# Patient Record
Sex: FEMALE | Race: WHITE | NOT HISPANIC OR LATINO | Employment: OTHER | URBAN - METROPOLITAN AREA
[De-identification: names, ages, dates, MRNs, and addresses within clinical notes are randomized per-mention and may not be internally consistent; named-entity substitution may affect disease eponyms.]

---

## 2018-06-14 ENCOUNTER — HISTORICAL (OUTPATIENT)
Dept: BARIATRICS | Facility: HOSPITAL | Age: 23
End: 2018-06-14

## 2018-07-03 ENCOUNTER — HISTORICAL (OUTPATIENT)
Dept: BARIATRICS | Facility: HOSPITAL | Age: 23
End: 2018-07-03

## 2018-07-13 ENCOUNTER — HISTORICAL (OUTPATIENT)
Dept: SURGERY | Facility: HOSPITAL | Age: 23
End: 2018-07-13

## 2018-07-19 ENCOUNTER — HISTORICAL (OUTPATIENT)
Dept: BARIATRICS | Facility: HOSPITAL | Age: 23
End: 2018-07-19

## 2018-08-02 ENCOUNTER — HISTORICAL (OUTPATIENT)
Dept: BARIATRICS | Facility: HOSPITAL | Age: 23
End: 2018-08-02

## 2018-10-09 ENCOUNTER — HISTORICAL (OUTPATIENT)
Dept: BARIATRICS | Facility: HOSPITAL | Age: 23
End: 2018-10-09

## 2018-12-03 ENCOUNTER — HISTORICAL (OUTPATIENT)
Dept: BARIATRICS | Facility: HOSPITAL | Age: 23
End: 2018-12-03

## 2019-02-19 ENCOUNTER — HISTORICAL (OUTPATIENT)
Dept: BARIATRICS | Facility: HOSPITAL | Age: 24
End: 2019-02-19

## 2020-06-03 DIAGNOSIS — F32.A DEPRESSION, UNSPECIFIED DEPRESSION TYPE: Primary | ICD-10-CM

## 2020-06-03 RX ORDER — SERTRALINE HYDROCHLORIDE 100 MG/1
100 TABLET, FILM COATED ORAL DAILY
Qty: 30 TABLET | Refills: 11 | Status: SHIPPED | OUTPATIENT
Start: 2020-06-03 | End: 2021-06-29

## 2020-06-03 NOTE — TELEPHONE ENCOUNTER
----- Message from Rachel Harsha sent at 6/3/2020 10:33 AM CDT -----  Contact: PT  Type:  Needs Medical Advice    Who Called: Sachi Mota  Symptoms (please be specific): N/A  How long has patient had these symptoms:  N/A  Pharmacy name and phone #:  [facility]  [address]   [phone]  Would the patient rather a call back or a response via [facility]? Call back  Best Call Back Number: [phone] (cell)  Additional Information: PT is requesting for a RX Zoloft (generic Brand). Patient stated that she does not remember the dosage.  Patient also stated that her PCP pass away and was prescribing her with the medication.

## 2020-10-23 ENCOUNTER — TELEPHONE (OUTPATIENT)
Dept: OBSTETRICS AND GYNECOLOGY | Facility: CLINIC | Age: 25
End: 2020-10-23

## 2020-10-23 DIAGNOSIS — Z78.9 ATTEMPTING TO CONCEIVE: Primary | ICD-10-CM

## 2020-10-23 NOTE — TELEPHONE ENCOUNTER
----- Message from Angel Cancino sent at 10/23/2020 10:21 AM CDT -----  Caller is calling in regards to some question and concerns about her getting pregnant // pt states that her periods are not regular // pt just wants someone to call her back to consults // caller can be reached at [phone]

## 2020-10-23 NOTE — TELEPHONE ENCOUNTER
PATIENT CALLED REPORTING SHE HAS BEEN ATTEMPTING PREGNANCY FOR ONE YEAR.  PERIODS HAVE BECOME IRREGULAR THIS SUMMER  SHE IS INTERESTED IN TRYING MEDICATIONS TO HELP HER CONCEIVE  HUSBANDS SEMEN ANALYSIS WAS NORMAL    ALLERGIES: SULFA    MEDS;   ZOLOFT    PELVIC ULTRASOUND ORDERED AND SCHEDULED

## 2020-10-27 ENCOUNTER — PROCEDURE VISIT (OUTPATIENT)
Dept: OBSTETRICS AND GYNECOLOGY | Facility: CLINIC | Age: 25
End: 2020-10-27
Payer: COMMERCIAL

## 2020-10-27 DIAGNOSIS — Z78.9 ATTEMPTING TO CONCEIVE: ICD-10-CM

## 2020-10-27 PROCEDURE — 76830 PR  ECHOGRAPHY,TRANSVAGINAL: ICD-10-PCS | Mod: S$GLB,,, | Performed by: OBSTETRICS & GYNECOLOGY

## 2020-10-27 PROCEDURE — 76830 TRANSVAGINAL US NON-OB: CPT | Mod: S$GLB,,, | Performed by: OBSTETRICS & GYNECOLOGY

## 2020-10-27 NOTE — Clinical Note
Please call  she likely needs an apt to discuss as her endometrial strip is thickened   also needs U/s right after her cycle to re assess the endometrial stripe

## 2020-11-13 ENCOUNTER — TELEPHONE (OUTPATIENT)
Dept: OBSTETRICS AND GYNECOLOGY | Facility: CLINIC | Age: 25
End: 2020-11-13

## 2020-11-13 NOTE — TELEPHONE ENCOUNTER
----- Message from Arlene Bacon sent at 11/13/2020  9:31 AM CST -----  states that she started cycle, when does she need to do ultrasound..[phone]

## 2020-11-17 ENCOUNTER — TELEPHONE (OUTPATIENT)
Dept: OBSTETRICS AND GYNECOLOGY | Facility: CLINIC | Age: 25
End: 2020-11-17

## 2020-11-17 DIAGNOSIS — R93.89 INCREASED ENDOMETRIAL STRIPE THICKNESS: Primary | ICD-10-CM

## 2020-11-17 NOTE — TELEPHONE ENCOUNTER
----- Message from Magdy Wagner III, MD sent at 11/7/2020  4:29 PM CST -----  Please call  she likely needs an apt to discuss as her endometrial strip is thickened   also needs U/s right after her cycle to re assess the endometrial stripe

## 2020-11-17 NOTE — TELEPHONE ENCOUNTER
Spoke with patient about u/s results. She states that she completed a cycle yesterday. Appointment and u/s scheduled for tomorrow.

## 2020-11-18 ENCOUNTER — PROCEDURE VISIT (OUTPATIENT)
Dept: OBSTETRICS AND GYNECOLOGY | Facility: CLINIC | Age: 25
End: 2020-11-18
Payer: COMMERCIAL

## 2020-11-18 ENCOUNTER — OFFICE VISIT (OUTPATIENT)
Dept: OBSTETRICS AND GYNECOLOGY | Facility: CLINIC | Age: 25
End: 2020-11-18
Payer: COMMERCIAL

## 2020-11-18 VITALS
WEIGHT: 235 LBS | HEART RATE: 64 BPM | SYSTOLIC BLOOD PRESSURE: 123 MMHG | DIASTOLIC BLOOD PRESSURE: 86 MMHG | BODY MASS INDEX: 41.64 KG/M2 | HEIGHT: 63 IN

## 2020-11-18 DIAGNOSIS — R93.89 INCREASED ENDOMETRIAL STRIPE THICKNESS: ICD-10-CM

## 2020-11-18 DIAGNOSIS — N91.1 AMENORRHEA, SECONDARY: Primary | ICD-10-CM

## 2020-11-18 PROCEDURE — 99214 OFFICE O/P EST MOD 30 MIN: CPT | Mod: 25,S$GLB,, | Performed by: OBSTETRICS & GYNECOLOGY

## 2020-11-18 PROCEDURE — 99214 PR OFFICE/OUTPT VISIT, EST, LEVL IV, 30-39 MIN: ICD-10-PCS | Mod: 25,S$GLB,, | Performed by: OBSTETRICS & GYNECOLOGY

## 2020-11-18 PROCEDURE — 76830 PR  ECHOGRAPHY,TRANSVAGINAL: ICD-10-PCS | Mod: S$GLB,,, | Performed by: OBSTETRICS & GYNECOLOGY

## 2020-11-18 PROCEDURE — 76830 TRANSVAGINAL US NON-OB: CPT | Mod: S$GLB,,, | Performed by: OBSTETRICS & GYNECOLOGY

## 2020-11-18 RX ORDER — LETROZOLE 2.5 MG/1
5 TABLET, FILM COATED ORAL DAILY
Qty: 10 TABLET | Refills: 3 | Status: SHIPPED | OUTPATIENT
Start: 2020-11-18 | End: 2021-08-18

## 2020-11-18 RX ORDER — MEDROXYPROGESTERONE ACETATE 10 MG/1
10 TABLET ORAL DAILY
Qty: 10 TABLET | Refills: 3 | Status: SHIPPED | OUTPATIENT
Start: 2020-11-18 | End: 2021-08-18

## 2020-11-18 NOTE — PROGRESS NOTES
Subjective:       Patient ID: Sachi Loaiza is a 25 y.o. female.    Chief Complaint:  Follow-up (Had u/s. Thickened endometrial stripe. Cycle ended yesterday. Desires pregnancy. Previous cycle was in June. Cycles are very irregular. )      History of Present Illness  She is doing well.  No new health issues,  No abnormal bleeding, No GI or Gu concerns,  No dyspareunia  . Her cycles were regular   Last visit   In may her cycles started to become irregular  Now she has had some that are very spaced out.  She has ovulated twice based on the ovulation strip.   Her  did a semen analysis and was fine.     HPI      GYN & OB History  Patient's last menstrual period was 11/11/2020.     Date of Last Pap: No result found    OB History   No obstetric history on file.       Review of Systems  Review of Systems   Genitourinary:        Oligo             Objective:    Physical Exam       Assessment:        1. Amenorrhea, secondary                 Plan:           She elected to try meds without doing an HSG   She is aware that her tubes could be blocked but not likely based on her hx.     Offered to see infertility  She will try 3-4 rounds of meds   Pap discussed will return for her annual     Pt is aware we call all results. If she does not hear from our office regarding her result within a week of having a study or procedure performed she is to call the office so that we can research the result for her.  Birth control discussed  Chaperone was present

## 2020-12-04 ENCOUNTER — TELEPHONE (OUTPATIENT)
Dept: OBSTETRICS AND GYNECOLOGY | Facility: CLINIC | Age: 25
End: 2020-12-04

## 2020-12-04 NOTE — TELEPHONE ENCOUNTER
----- Message from Jada Donovan sent at 12/4/2020  8:15 AM CST -----  Patient need to speak to nurse regarding her prescription needing to be signed. Call back number  [phone]. Luh

## 2020-12-04 NOTE — TELEPHONE ENCOUNTER
Spoke to patient. She states that she was unable to  some of her prescriptions at the pharmacy.     [facility] called [facility] and they said that 2 of her prescriptions need a pa and they will fax the request.

## 2020-12-11 ENCOUNTER — TELEPHONE (OUTPATIENT)
Dept: OBSTETRICS AND GYNECOLOGY | Facility: CLINIC | Age: 25
End: 2020-12-11

## 2020-12-11 NOTE — TELEPHONE ENCOUNTER
LVM informing pt per Dr Pollock sometimes it takes until day 10 and the Provera is completed before the period starts. Instructed pt to call back on Monday if any questions.

## 2020-12-11 NOTE — TELEPHONE ENCOUNTER
----- Message from Becca Nazario sent at 12/11/2020 11:01 AM CST -----  Regarding: Medication question  Please call Sachi to discuss a question she has about medroxyPROGESTERone (PROVERA) 10 MG tablet, Pt stated that she is on this medication to start her period but she has not started it yet and she's on day 9.  Contact number: [phone].

## 2020-12-15 ENCOUNTER — TELEPHONE (OUTPATIENT)
Dept: OBSTETRICS AND GYNECOLOGY | Facility: CLINIC | Age: 25
End: 2020-12-15

## 2020-12-15 NOTE — TELEPHONE ENCOUNTER
Patient completed a round of provera and still has not started her cycle several days later.     Advised patient I will check with Dr. Wagner and call her back.

## 2020-12-15 NOTE — TELEPHONE ENCOUNTER
----- Message from Becca Nazario sent at 12/14/2020 11:43 AM CST -----  Regarding: Medication Question  Caller has a question regarding the medication she's taking to start her cycle. Patient stated that she has finished the medication but she have not started her cycle. Patient would like to know if she needs to be put on a different medication or if she needs a refill [phone].

## 2020-12-16 NOTE — TELEPHONE ENCOUNTER
Spoke to Dr. Wagner. He advised that patient take another pregnancy test. If negative start femara and if positive start progesterone.     Spoke to patient and advised of Dr. Wagner's recommendation. She states that she will get a test and call tomorrow with result.

## 2020-12-18 ENCOUNTER — TELEPHONE (OUTPATIENT)
Dept: OBSTETRICS AND GYNECOLOGY | Facility: CLINIC | Age: 25
End: 2020-12-18

## 2020-12-18 NOTE — TELEPHONE ENCOUNTER
----- Message from Kat Hoang sent at 12/18/2020 10:17 AM CST -----  Contact: self  Requesting call back regarding speaking with nurse about pt medication. States she started her cycle today and wants to know if insurance has been contacted. Please call back at [phone].

## 2020-12-18 NOTE — TELEPHONE ENCOUNTER
"Pt. Checking on status of PA of fertility meds.  [facility] is suppose to fax over the PA form to "hailey" on meds. Today.    "

## 2020-12-18 NOTE — TELEPHONE ENCOUNTER
----- Message from Kat Hoang sent at 12/18/2020 10:17 AM CST -----  Contact: self  Requesting call back regarding speaking with nurse about pt medication. States she started her cycle today and wants to know if insurance has been contacted. Please call back at [phone].

## 2020-12-18 NOTE — TELEPHONE ENCOUNTER
----- Message from Nilsa Wills sent at 12/18/2020 10:58 AM CST -----  Regarding: Returning call  Contact: [phone]  Would like to consult with the nurse, patient is returning the nurse call, please call back thanks sj   .Type:  Patient Returning Call    Who Called Ms Loaiza  Who Left Message for Patient:  Does the patient know what this is regarding?:  Would the patient rather a call back or a response via [facility]? callback  Best Call Back Number:[phone]  Additional Information:

## 2020-12-21 NOTE — TELEPHONE ENCOUNTER
PA is in process. Will check on it again this afternoon. Patient states that today is day 3 of her cycle.

## 2020-12-28 ENCOUNTER — TELEPHONE (OUTPATIENT)
Dept: OBSTETRICS AND GYNECOLOGY | Facility: CLINIC | Age: 25
End: 2020-12-28

## 2021-01-06 ENCOUNTER — PROCEDURE VISIT (OUTPATIENT)
Dept: OBSTETRICS AND GYNECOLOGY | Facility: CLINIC | Age: 26
End: 2021-01-06
Payer: COMMERCIAL

## 2021-01-06 DIAGNOSIS — N91.1 AMENORRHEA, SECONDARY: ICD-10-CM

## 2021-01-06 PROCEDURE — 76830 PR  ECHOGRAPHY,TRANSVAGINAL: ICD-10-PCS | Mod: S$GLB,,, | Performed by: OBSTETRICS & GYNECOLOGY

## 2021-01-06 PROCEDURE — 76830 TRANSVAGINAL US NON-OB: CPT | Mod: S$GLB,,, | Performed by: OBSTETRICS & GYNECOLOGY

## 2021-01-12 ENCOUNTER — TELEPHONE (OUTPATIENT)
Dept: OBSTETRICS AND GYNECOLOGY | Facility: CLINIC | Age: 26
End: 2021-01-12

## 2021-01-19 ENCOUNTER — TELEPHONE (OUTPATIENT)
Dept: OBSTETRICS AND GYNECOLOGY | Facility: CLINIC | Age: 26
End: 2021-01-19

## 2021-01-19 DIAGNOSIS — Z32.00 POSSIBLE PREGNANCY: Primary | ICD-10-CM

## 2021-01-20 LAB
B-HCG SERPL-ACNC: 6.36 MIU/ML
PROGEST SERPL-MCNC: 2.01 NG/ML

## 2021-01-21 ENCOUNTER — TELEPHONE (OUTPATIENT)
Dept: OBSTETRICS AND GYNECOLOGY | Facility: CLINIC | Age: 26
End: 2021-01-21

## 2021-01-22 ENCOUNTER — TELEPHONE (OUTPATIENT)
Dept: OBSTETRICS AND GYNECOLOGY | Facility: CLINIC | Age: 26
End: 2021-01-22

## 2021-01-22 LAB
B-HCG SERPL-ACNC: <1.59 MIU/ML
PROGEST SERPL-MCNC: 0.34 NG/ML

## 2021-02-02 ENCOUNTER — TELEPHONE (OUTPATIENT)
Dept: OBSTETRICS AND GYNECOLOGY | Facility: CLINIC | Age: 26
End: 2021-02-02

## 2021-02-02 ENCOUNTER — PROCEDURE VISIT (OUTPATIENT)
Dept: OBSTETRICS AND GYNECOLOGY | Facility: CLINIC | Age: 26
End: 2021-02-02
Payer: MEDICAID

## 2021-02-02 DIAGNOSIS — Z34.90 PREGNANCY, UNSPECIFIED GESTATIONAL AGE: ICD-10-CM

## 2021-02-02 DIAGNOSIS — Z34.90 PREGNANCY, UNSPECIFIED GESTATIONAL AGE: Primary | ICD-10-CM

## 2021-02-02 PROCEDURE — 76830 TRANSVAGINAL US NON-OB: CPT | Mod: S$GLB,,, | Performed by: OBSTETRICS & GYNECOLOGY

## 2021-02-02 PROCEDURE — 76830 PR  ECHOGRAPHY,TRANSVAGINAL: ICD-10-PCS | Mod: S$GLB,,, | Performed by: OBSTETRICS & GYNECOLOGY

## 2021-02-25 ENCOUNTER — TELEPHONE (OUTPATIENT)
Dept: OBSTETRICS AND GYNECOLOGY | Facility: CLINIC | Age: 26
End: 2021-02-25

## 2021-02-25 DIAGNOSIS — Z32.01 POSITIVE PREGNANCY TEST: Primary | ICD-10-CM

## 2021-02-26 LAB
B-HCG SERPL-ACNC: 6028 MIU/ML
PROGEST SERPL-MCNC: 15.9 NG/ML

## 2021-03-01 ENCOUNTER — PATIENT MESSAGE (OUTPATIENT)
Dept: OBSTETRICS AND GYNECOLOGY | Facility: CLINIC | Age: 26
End: 2021-03-01

## 2021-03-01 DIAGNOSIS — R79.89 LOW SERUM PROGESTERONE: Primary | ICD-10-CM

## 2021-03-01 LAB — B-HCG SERPL-ACNC: NORMAL MIU/ML

## 2021-03-01 RX ORDER — PROGESTERONE 200 MG/1
200 CAPSULE ORAL NIGHTLY
Qty: 30 CAPSULE | Refills: 2 | Status: SHIPPED | OUTPATIENT
Start: 2021-03-01 | End: 2021-03-31

## 2021-03-08 ENCOUNTER — CLINICAL SUPPORT (OUTPATIENT)
Dept: OBSTETRICS AND GYNECOLOGY | Facility: CLINIC | Age: 26
End: 2021-03-08
Payer: MEDICAID

## 2021-03-08 ENCOUNTER — OFFICE VISIT (OUTPATIENT)
Dept: OBSTETRICS AND GYNECOLOGY | Facility: CLINIC | Age: 26
End: 2021-03-08
Payer: MEDICAID

## 2021-03-08 VITALS
DIASTOLIC BLOOD PRESSURE: 87 MMHG | SYSTOLIC BLOOD PRESSURE: 128 MMHG | HEART RATE: 97 BPM | BODY MASS INDEX: 40.03 KG/M2 | WEIGHT: 226 LBS

## 2021-03-08 VITALS
SYSTOLIC BLOOD PRESSURE: 128 MMHG | BODY MASS INDEX: 40.03 KG/M2 | WEIGHT: 226 LBS | DIASTOLIC BLOOD PRESSURE: 87 MMHG | HEART RATE: 97 BPM

## 2021-03-08 DIAGNOSIS — Z3A.01 LESS THAN 8 WEEKS GESTATION OF PREGNANCY: Primary | ICD-10-CM

## 2021-03-08 DIAGNOSIS — Z01.419 WELL WOMAN EXAM WITH ROUTINE GYNECOLOGICAL EXAM: Primary | ICD-10-CM

## 2021-03-08 DIAGNOSIS — O21.9 NAUSEA AND VOMITING IN PREGNANCY: Primary | ICD-10-CM

## 2021-03-08 PROCEDURE — 99395 PREV VISIT EST AGE 18-39: CPT | Mod: S$GLB,,, | Performed by: OBSTETRICS & GYNECOLOGY

## 2021-03-08 PROCEDURE — 99395 PR PREVENTIVE VISIT,EST,18-39: ICD-10-PCS | Mod: S$GLB,,, | Performed by: OBSTETRICS & GYNECOLOGY

## 2021-03-08 RX ORDER — DOXYLAMINE SUCCINATE AND PYRIDOXINE HYDROCHLORIDE, DELAYED RELEASE TABLETS 10 MG/10 MG 10; 10 MG/1; MG/1
TABLET, DELAYED RELEASE ORAL
Qty: 60 TABLET | Refills: 1 | Status: SHIPPED | OUTPATIENT
Start: 2021-03-08 | End: 2021-04-09

## 2021-03-09 LAB
CHLAMYDIA: NEGATIVE
GONORRHEA: NEGATIVE
SOURCE: NORMAL

## 2021-03-10 DIAGNOSIS — B37.9 YEAST INFECTION: ICD-10-CM

## 2021-03-10 DIAGNOSIS — O23.41 URINARY TRACT INFECTION IN MOTHER DURING FIRST TRIMESTER OF PREGNANCY: Primary | ICD-10-CM

## 2021-03-10 DIAGNOSIS — O99.011 ANEMIA AFFECTING PREGNANCY IN FIRST TRIMESTER: ICD-10-CM

## 2021-03-10 LAB
ABS NRBC COUNT: 0 X 10 3/UL (ref 0–0.01)
ABSOLUTE BASOPHIL: 0.05 X 10 3/UL (ref 0–0.22)
ABSOLUTE EOSINOPHIL: 0.2 X 10 3/UL (ref 0.04–0.54)
ABSOLUTE IMMATURE GRAN: 0.04 X 10 3/UL (ref 0–0.04)
ABSOLUTE LYMPHOCYTE: 2.73 X 10 3/UL (ref 0.86–4.75)
ABSOLUTE MONOCYTE: 0.7 X 10 3/UL (ref 0.22–1.08)
ALBUMIN SERPL-MCNC: 4.2 G/DL (ref 3.5–5.2)
ALBUMIN/GLOB SERPL ELPH: 1.4 {RATIO} (ref 1–2.7)
ALP ISOS SERPL LEV INH-CCNC: 89 U/L (ref 35–105)
ALT (SGPT): 30 U/L (ref 0–33)
AMORPH URATE CRY URNS QL MICRO: NEGATIVE
ANION GAP SERPL CALC-SCNC: 9 MMOL/L (ref 8–17)
ANTIBODY SCREEN: NEGATIVE
AST SERPL-CCNC: 18 U/L (ref 0–32)
B-HCG SERPL-ACNC: NORMAL MIU/ML
B12: 457 PG/ML (ref 232–1245)
BACTERIA #/AREA URNS HPF: ABNORMAL /[HPF]
BASOPHILS NFR BLD: 0.4 % (ref 0.2–1.2)
BILIRUB UR QL STRIP: NEGATIVE
BILIRUBIN, TOTAL: 0.28 MG/DL (ref 0–1.2)
BLOOD GROUPING: NORMAL
BLOOD TYPE (D): POSITIVE
BUN/CREAT SERPL: 13.7 (ref 6–20)
CALCIUM SERPL-MCNC: 9 MG/DL (ref 8.6–10.2)
CARBON DIOXIDE, CO2: 26 MMOL/L (ref 22–29)
CHLORIDE: 104 MMOL/L (ref 98–107)
CLARITY UR: ABNORMAL
COLOR UR: YELLOW
CREAT SERPL-MCNC: 0.51 MG/DL (ref 0.5–0.9)
EOSINOPHIL NFR BLD: 1.8 % (ref 0.7–7)
EPITHELIAL CELLS: ABNORMAL
FOLATE SERPL-MCNC: 10.3 NG/ML (ref 5–27.2)
GFR ESTIMATION: 146.93
GLOBULIN: 3 G/DL (ref 1.5–4.5)
GLUCOSE (UA): NEGATIVE MG/DL
GLUCOSE: 86 MG/DL (ref 74–106)
HBV SURFACE AG SERPL QL IA: NONREACTIVE
HCT VFR BLD AUTO: 36.7 % (ref 37–47)
HCV IGG SERPL QL IA: NONREACTIVE
HGB BLD-MCNC: 10.9 G/DL (ref 12–16)
HIV 1+2 AB+HIV1 P24 AG SERPL QL IA: NONREACTIVE
HSV1 IGG SER-ACNC: NONREACTIVE
HSV2 IGG SER-ACNC: NONREACTIVE
IMMATURE GRANULOCYTES: 0.4 % (ref 0–0.5)
KETONES UR QL STRIP: 15 MG/DL
LEUKOCYTE ESTERASE UR QL STRIP: ABNORMAL
LYMPHOCYTES NFR BLD: 24.2 % (ref 19.3–53.1)
MCH RBC QN AUTO: 22.9 PG (ref 27–32)
MCHC RBC AUTO-ENTMCNC: 29.7 G/DL (ref 32–36)
MCV RBC AUTO: 77.1 FL (ref 82–100)
MONOCYTES NFR BLD: 6.2 % (ref 4.7–12.5)
MUCOUS THREADS URNS QL MICRO: ABNORMAL
NEUTROPHILS # BLD AUTO: 7.55 X 10 3/UL (ref 2.15–7.56)
NEUTROPHILS NFR BLD: 67 %
NITRITE UR QL STRIP: NEGATIVE
NUCLEATED RED BLOOD CELLS: 0 /100 WBC (ref 0–0.2)
OCCULT BLOOD: ABNORMAL
PH, URINE: 6 (ref 5–7.5)
PLATELET # BLD AUTO: 456 X 10 3/UL (ref 135–400)
POTASSIUM: 4.4 MMOL/L (ref 3.5–5.1)
PROT SNV-MCNC: 7.2 G/DL (ref 6.4–8.3)
PROT UR QL STRIP: 15 MG/DL
RBC # BLD AUTO: 4.76 X 10 6/UL (ref 4.2–5.4)
RBC/HPF: NEGATIVE
RDW-SD: 49.4 FL (ref 37–54)
RUBELLA IGG SCREEN: NORMAL
SODIUM: 139 MMOL/L (ref 136–145)
SP GR UR STRIP: 1.02 (ref 1–1.03)
SYPHILIS TREPONEMAL ANTIBODY: NONREACTIVE
T4, FREE: 1.23 NG/DL (ref 0.93–1.7)
TSH SERPL DL<=0.005 MIU/L-ACNC: 1.8 UIU/ML (ref 0.27–4.2)
UREA NITROGEN (BUN): 7 MG/DL (ref 6–20)
URINE CULTURE, ROUTINE: NORMAL
UROBILINOGEN, URINE: NORMAL E.U./DL (ref 0–1)
WBC # BLD: 11.27 X 10 3/UL (ref 4.3–10.8)
WBC/HPF: ABNORMAL
YEAST URNS QL MICRO: ABNORMAL

## 2021-03-10 RX ORDER — TERCONAZOLE 4 MG/G
1 CREAM VAGINAL NIGHTLY
Qty: 1 TUBE | Refills: 1 | Status: SHIPPED | OUTPATIENT
Start: 2021-03-10 | End: 2021-08-18

## 2021-03-10 RX ORDER — CEPHALEXIN 250 MG/1
250 CAPSULE ORAL 3 TIMES DAILY
Qty: 21 CAPSULE | Refills: 0 | Status: SHIPPED | OUTPATIENT
Start: 2021-03-10 | End: 2021-03-17

## 2021-03-11 ENCOUNTER — PROCEDURE VISIT (OUTPATIENT)
Dept: OBSTETRICS AND GYNECOLOGY | Facility: CLINIC | Age: 26
End: 2021-03-11
Payer: MEDICAID

## 2021-03-11 DIAGNOSIS — Z3A.01 LESS THAN 8 WEEKS GESTATION OF PREGNANCY: ICD-10-CM

## 2021-03-11 PROCEDURE — 76801 PR US, OB <14WKS, TRANSABD, SINGLE GESTATION: ICD-10-PCS | Mod: S$GLB,,, | Performed by: OBSTETRICS & GYNECOLOGY

## 2021-03-11 PROCEDURE — 76801 OB US < 14 WKS SINGLE FETUS: CPT | Mod: S$GLB,,, | Performed by: OBSTETRICS & GYNECOLOGY

## 2021-04-05 ENCOUNTER — PROCEDURE VISIT (OUTPATIENT)
Dept: OBSTETRICS AND GYNECOLOGY | Facility: CLINIC | Age: 26
End: 2021-04-05

## 2021-04-05 ENCOUNTER — ROUTINE PRENATAL (OUTPATIENT)
Dept: OBSTETRICS AND GYNECOLOGY | Facility: CLINIC | Age: 26
End: 2021-04-05
Payer: MEDICAID

## 2021-04-05 VITALS
WEIGHT: 224 LBS | BODY MASS INDEX: 39.68 KG/M2 | HEART RATE: 81 BPM | DIASTOLIC BLOOD PRESSURE: 81 MMHG | SYSTOLIC BLOOD PRESSURE: 123 MMHG

## 2021-04-05 DIAGNOSIS — Z34.90 PREGNANCY, UNSPECIFIED GESTATIONAL AGE: Primary | ICD-10-CM

## 2021-04-05 DIAGNOSIS — O30.001 TWIN GESTATION IN FIRST TRIMESTER, UNSPECIFIED MULTIPLE GESTATION TYPE: Primary | ICD-10-CM

## 2021-04-05 PROCEDURE — 0502F PR SUBSEQUENT PRENATAL CARE: ICD-10-PCS | Mod: S$GLB,,, | Performed by: OBSTETRICS & GYNECOLOGY

## 2021-04-05 PROCEDURE — 0502F SUBSEQUENT PRENATAL CARE: CPT | Mod: S$GLB,,, | Performed by: OBSTETRICS & GYNECOLOGY

## 2021-04-05 PROCEDURE — 76801 PR US, OB <14WKS, TRANSABD, SINGLE GESTATION: ICD-10-PCS | Mod: S$GLB,,, | Performed by: OBSTETRICS & GYNECOLOGY

## 2021-04-05 PROCEDURE — 76802 PR US, OB <14WKS, TRANSABD, EA ADDL GESTATION: ICD-10-PCS | Mod: S$GLB,,, | Performed by: OBSTETRICS & GYNECOLOGY

## 2021-04-05 PROCEDURE — 76801 OB US < 14 WKS SINGLE FETUS: CPT | Mod: S$GLB,,, | Performed by: OBSTETRICS & GYNECOLOGY

## 2021-04-05 PROCEDURE — 76802 OB US < 14 WKS ADDL FETUS: CPT | Mod: S$GLB,,, | Performed by: OBSTETRICS & GYNECOLOGY

## 2021-04-05 RX ORDER — PROMETHAZINE HYDROCHLORIDE 12.5 MG/1
12.5 TABLET ORAL 4 TIMES DAILY
Qty: 18 TABLET | Refills: 3 | Status: SHIPPED | OUTPATIENT
Start: 2021-04-05 | End: 2021-08-18

## 2021-04-19 ENCOUNTER — TELEPHONE (OUTPATIENT)
Dept: OBSTETRICS AND GYNECOLOGY | Facility: CLINIC | Age: 26
End: 2021-04-19

## 2021-04-19 ENCOUNTER — PATIENT MESSAGE (OUTPATIENT)
Dept: OBSTETRICS AND GYNECOLOGY | Facility: CLINIC | Age: 26
End: 2021-04-19

## 2021-05-04 ENCOUNTER — ROUTINE PRENATAL (OUTPATIENT)
Dept: OBSTETRICS AND GYNECOLOGY | Facility: CLINIC | Age: 26
End: 2021-05-04
Payer: MEDICAID

## 2021-05-04 ENCOUNTER — TELEPHONE (OUTPATIENT)
Dept: OBSTETRICS AND GYNECOLOGY | Facility: CLINIC | Age: 26
End: 2021-05-04

## 2021-05-04 ENCOUNTER — PROCEDURE VISIT (OUTPATIENT)
Dept: OBSTETRICS AND GYNECOLOGY | Facility: CLINIC | Age: 26
End: 2021-05-04
Payer: MEDICAID

## 2021-05-04 VITALS
WEIGHT: 222 LBS | BODY MASS INDEX: 39.33 KG/M2 | HEART RATE: 80 BPM | DIASTOLIC BLOOD PRESSURE: 76 MMHG | SYSTOLIC BLOOD PRESSURE: 120 MMHG

## 2021-05-04 DIAGNOSIS — F32.A ANXIETY AND DEPRESSION: Primary | ICD-10-CM

## 2021-05-04 DIAGNOSIS — O30.002 TWIN GESTATION IN SECOND TRIMESTER, UNSPECIFIED MULTIPLE GESTATION TYPE: ICD-10-CM

## 2021-05-04 DIAGNOSIS — Z34.90 PREGNANCY, UNSPECIFIED GESTATIONAL AGE: ICD-10-CM

## 2021-05-04 DIAGNOSIS — O30.002 TWIN GESTATION IN SECOND TRIMESTER, UNSPECIFIED MULTIPLE GESTATION TYPE: Primary | ICD-10-CM

## 2021-05-04 DIAGNOSIS — F41.9 ANXIETY AND DEPRESSION: Primary | ICD-10-CM

## 2021-05-04 PROCEDURE — 76816 PR  US,PREGNANT UTERUS,F/U,TRANSABD APP: ICD-10-PCS | Mod: 22,S$GLB,, | Performed by: OBSTETRICS & GYNECOLOGY

## 2021-05-04 PROCEDURE — 99213 PR OFFICE/OUTPT VISIT, EST, LEVL III, 20-29 MIN: ICD-10-PCS | Mod: TH,S$GLB,, | Performed by: OBSTETRICS & GYNECOLOGY

## 2021-05-04 PROCEDURE — 76816 OB US FOLLOW-UP PER FETUS: CPT | Mod: 22,S$GLB,, | Performed by: OBSTETRICS & GYNECOLOGY

## 2021-05-04 PROCEDURE — 99213 OFFICE O/P EST LOW 20 MIN: CPT | Mod: TH,S$GLB,, | Performed by: OBSTETRICS & GYNECOLOGY

## 2021-05-04 RX ORDER — ESCITALOPRAM OXALATE 10 MG/1
TABLET ORAL
Qty: 90 TABLET | Refills: 3 | Status: SHIPPED | OUTPATIENT
Start: 2021-05-04 | End: 2021-08-18

## 2021-05-04 RX ORDER — ONDANSETRON HYDROCHLORIDE 8 MG/1
8 TABLET, FILM COATED ORAL 2 TIMES DAILY
Qty: 18 TABLET | Refills: 1 | Status: SHIPPED | OUTPATIENT
Start: 2021-05-04 | End: 2021-08-18

## 2021-05-11 ENCOUNTER — TELEPHONE (OUTPATIENT)
Dept: OBSTETRICS AND GYNECOLOGY | Facility: CLINIC | Age: 26
End: 2021-05-11

## 2021-05-21 ENCOUNTER — PATIENT MESSAGE (OUTPATIENT)
Dept: OBSTETRICS AND GYNECOLOGY | Facility: CLINIC | Age: 26
End: 2021-05-21

## 2021-05-24 ENCOUNTER — TELEPHONE (OUTPATIENT)
Dept: OBSTETRICS AND GYNECOLOGY | Facility: CLINIC | Age: 26
End: 2021-05-24

## 2021-05-24 DIAGNOSIS — O26.859 SPOTTING IN PREGNANCY: Primary | ICD-10-CM

## 2021-05-25 ENCOUNTER — PROCEDURE VISIT (OUTPATIENT)
Dept: OBSTETRICS AND GYNECOLOGY | Facility: CLINIC | Age: 26
End: 2021-05-25
Payer: MEDICAID

## 2021-05-25 DIAGNOSIS — O26.859 SPOTTING IN PREGNANCY: ICD-10-CM

## 2021-05-25 PROCEDURE — 76815 PR  US,PREGNANT UTERUS,LIMITED, 1/> FETUSES: ICD-10-PCS | Mod: S$GLB,,, | Performed by: OBSTETRICS & GYNECOLOGY

## 2021-05-25 PROCEDURE — 76815 OB US LIMITED FETUS(S): CPT | Mod: S$GLB,,, | Performed by: OBSTETRICS & GYNECOLOGY

## 2021-06-01 ENCOUNTER — ROUTINE PRENATAL (OUTPATIENT)
Dept: OBSTETRICS AND GYNECOLOGY | Facility: CLINIC | Age: 26
End: 2021-06-01
Payer: MEDICAID

## 2021-06-01 VITALS
HEART RATE: 78 BPM | DIASTOLIC BLOOD PRESSURE: 78 MMHG | SYSTOLIC BLOOD PRESSURE: 121 MMHG | BODY MASS INDEX: 40.03 KG/M2 | WEIGHT: 226 LBS

## 2021-06-01 DIAGNOSIS — F41.9 ANXIETY AND DEPRESSION: ICD-10-CM

## 2021-06-01 DIAGNOSIS — F32.A ANXIETY AND DEPRESSION: ICD-10-CM

## 2021-06-01 DIAGNOSIS — Z34.90 PREGNANCY, UNSPECIFIED GESTATIONAL AGE: Primary | ICD-10-CM

## 2021-06-01 PROCEDURE — 99213 PR OFFICE/OUTPT VISIT, EST, LEVL III, 20-29 MIN: ICD-10-PCS | Mod: TH,S$GLB,, | Performed by: OBSTETRICS & GYNECOLOGY

## 2021-06-01 PROCEDURE — 99213 OFFICE O/P EST LOW 20 MIN: CPT | Mod: TH,S$GLB,, | Performed by: OBSTETRICS & GYNECOLOGY

## 2021-06-01 RX ORDER — BUSPIRONE HYDROCHLORIDE 5 MG/1
TABLET ORAL
COMMUNITY
Start: 2021-05-16 | End: 2021-08-18

## 2021-06-09 ENCOUNTER — PATIENT MESSAGE (OUTPATIENT)
Dept: OBSTETRICS AND GYNECOLOGY | Facility: CLINIC | Age: 26
End: 2021-06-09

## 2021-06-10 ENCOUNTER — TELEPHONE (OUTPATIENT)
Dept: OBSTETRICS AND GYNECOLOGY | Facility: CLINIC | Age: 26
End: 2021-06-10

## 2021-06-15 ENCOUNTER — PROCEDURE VISIT (OUTPATIENT)
Dept: OBSTETRICS AND GYNECOLOGY | Facility: CLINIC | Age: 26
End: 2021-06-15
Payer: MEDICAID

## 2021-06-15 DIAGNOSIS — O30.002 TWIN GESTATION IN SECOND TRIMESTER, UNSPECIFIED MULTIPLE GESTATION TYPE: ICD-10-CM

## 2021-06-15 PROCEDURE — 76805 OB US >/= 14 WKS SNGL FETUS: CPT | Mod: S$GLB,,, | Performed by: OBSTETRICS & GYNECOLOGY

## 2021-06-15 PROCEDURE — 76805 PR US, OB 14+WKS, TRANSABD, SINGLE GESTATION: ICD-10-PCS | Mod: S$GLB,,, | Performed by: OBSTETRICS & GYNECOLOGY

## 2021-06-15 PROCEDURE — 76810 PR US, OB 14+WKS, TRANSABD, EA ADDL GESTATION: ICD-10-PCS | Mod: S$GLB,,, | Performed by: OBSTETRICS & GYNECOLOGY

## 2021-06-15 PROCEDURE — 76810 OB US >/= 14 WKS ADDL FETUS: CPT | Mod: S$GLB,,, | Performed by: OBSTETRICS & GYNECOLOGY

## 2021-06-22 ENCOUNTER — PATIENT MESSAGE (OUTPATIENT)
Dept: OBSTETRICS AND GYNECOLOGY | Facility: CLINIC | Age: 26
End: 2021-06-22

## 2021-06-22 DIAGNOSIS — R42 DIZZINESS: Primary | ICD-10-CM

## 2021-06-22 DIAGNOSIS — O26.812 PREGNANCY RELATED FATIGUE IN SECOND TRIMESTER: ICD-10-CM

## 2021-06-28 ENCOUNTER — PATIENT MESSAGE (OUTPATIENT)
Dept: OBSTETRICS AND GYNECOLOGY | Facility: CLINIC | Age: 26
End: 2021-06-28

## 2021-06-28 LAB
ABS NRBC COUNT: 0 X 10 3/UL (ref 0–0.01)
ABSOLUTE BASOPHIL: 0.05 X 10 3/UL (ref 0–0.22)
ABSOLUTE EOSINOPHIL: 0.13 X 10 3/UL (ref 0.04–0.54)
ABSOLUTE IMMATURE GRAN: 0.25 X 10 3/UL (ref 0–0.04)
ABSOLUTE LYMPHOCYTE: 2.67 X 10 3/UL (ref 0.86–4.75)
ABSOLUTE MONOCYTE: 0.82 X 10 3/UL (ref 0.22–1.08)
BASOPHILS NFR BLD: 0.3 % (ref 0.2–1.2)
EOSINOPHIL NFR BLD: 0.9 % (ref 0.7–7)
HCT VFR BLD AUTO: 30.9 % (ref 37–47)
HGB BLD-MCNC: 9.2 G/DL (ref 12–16)
IMMATURE GRANULOCYTES: 1.7 % (ref 0–0.5)
LYMPHOCYTES NFR BLD: 17.8 % (ref 19.3–53.1)
MCH RBC QN AUTO: 23.2 PG (ref 27–32)
MCHC RBC AUTO-ENTMCNC: 29.8 G/DL (ref 32–36)
MCV RBC AUTO: 78 FL (ref 82–100)
MONOCYTES NFR BLD: 5.5 % (ref 4.7–12.5)
NEUTROPHILS # BLD AUTO: 11.06 X 10 3/UL (ref 2.15–7.56)
NEUTROPHILS NFR BLD: 73.8 %
NUCLEATED RED BLOOD CELLS: 0 /100 WBC (ref 0–0.2)
PLATELET # BLD AUTO: 285 X 10 3/UL (ref 135–400)
RBC # BLD AUTO: 3.96 X 10 6/UL (ref 4.2–5.4)
RDW-SD: 46.4 FL (ref 37–54)
WBC # BLD: 14.98 X 10 3/UL (ref 4.3–10.8)

## 2021-06-29 ENCOUNTER — PROCEDURE VISIT (OUTPATIENT)
Dept: OBSTETRICS AND GYNECOLOGY | Facility: CLINIC | Age: 26
End: 2021-06-29
Payer: MEDICAID

## 2021-06-29 ENCOUNTER — ROUTINE PRENATAL (OUTPATIENT)
Dept: OBSTETRICS AND GYNECOLOGY | Facility: CLINIC | Age: 26
End: 2021-06-29
Payer: MEDICAID

## 2021-06-29 VITALS
WEIGHT: 231 LBS | DIASTOLIC BLOOD PRESSURE: 78 MMHG | SYSTOLIC BLOOD PRESSURE: 116 MMHG | BODY MASS INDEX: 40.92 KG/M2 | HEART RATE: 89 BPM

## 2021-06-29 DIAGNOSIS — Z34.90 PREGNANCY, UNSPECIFIED GESTATIONAL AGE: ICD-10-CM

## 2021-06-29 DIAGNOSIS — O30.002 TWIN GESTATION IN SECOND TRIMESTER, UNSPECIFIED MULTIPLE GESTATION TYPE: Primary | ICD-10-CM

## 2021-06-29 DIAGNOSIS — O30.002 TWIN GESTATION IN SECOND TRIMESTER, UNSPECIFIED MULTIPLE GESTATION TYPE: ICD-10-CM

## 2021-06-29 PROCEDURE — 76810 PR US, OB 14+WKS, TRANSABD, EA ADDL GESTATION: ICD-10-PCS | Mod: S$GLB,,, | Performed by: OBSTETRICS & GYNECOLOGY

## 2021-06-29 PROCEDURE — 99213 OFFICE O/P EST LOW 20 MIN: CPT | Mod: 25,TH,S$GLB, | Performed by: OBSTETRICS & GYNECOLOGY

## 2021-06-29 PROCEDURE — 76805 OB US >/= 14 WKS SNGL FETUS: CPT | Mod: S$GLB,,, | Performed by: OBSTETRICS & GYNECOLOGY

## 2021-06-29 PROCEDURE — 76805 PR US, OB 14+WKS, TRANSABD, SINGLE GESTATION: ICD-10-PCS | Mod: S$GLB,,, | Performed by: OBSTETRICS & GYNECOLOGY

## 2021-06-29 PROCEDURE — 76810 OB US >/= 14 WKS ADDL FETUS: CPT | Mod: S$GLB,,, | Performed by: OBSTETRICS & GYNECOLOGY

## 2021-06-29 PROCEDURE — 99213 PR OFFICE/OUTPT VISIT, EST, LEVL III, 20-29 MIN: ICD-10-PCS | Mod: 25,TH,S$GLB, | Performed by: OBSTETRICS & GYNECOLOGY

## 2021-06-29 RX ORDER — SERTRALINE HYDROCHLORIDE 50 MG/1
50 TABLET, FILM COATED ORAL DAILY
COMMUNITY
End: 2021-08-03 | Stop reason: SDUPTHER

## 2021-07-13 ENCOUNTER — PATIENT MESSAGE (OUTPATIENT)
Dept: OBSTETRICS AND GYNECOLOGY | Facility: CLINIC | Age: 26
End: 2021-07-13

## 2021-07-19 ENCOUNTER — PATIENT MESSAGE (OUTPATIENT)
Dept: OBSTETRICS AND GYNECOLOGY | Facility: CLINIC | Age: 26
End: 2021-07-19

## 2021-07-26 ENCOUNTER — TELEPHONE (OUTPATIENT)
Dept: OBSTETRICS AND GYNECOLOGY | Facility: CLINIC | Age: 26
End: 2021-07-26

## 2021-07-26 ENCOUNTER — PATIENT MESSAGE (OUTPATIENT)
Dept: OBSTETRICS AND GYNECOLOGY | Facility: CLINIC | Age: 26
End: 2021-07-26

## 2021-07-26 DIAGNOSIS — O47.00 PRETERM CONTRACTIONS: Primary | ICD-10-CM

## 2021-07-26 LAB
APPEARANCE, UA: ABNORMAL
BACTERIA SPEC CULT: ABNORMAL /HPF
BILIRUB UR QL STRIP: NEGATIVE MG/DL
CALCIUM OXALATE CRYSTALS, UA: ABNORMAL /LPF
COLOR UR: YELLOW
FETAL FIBRONECTIN: NEGATIVE
GLUCOSE (UA): NORMAL MG/DL
HGB UR QL STRIP: 10 /UL
KETONES UR QL STRIP: ABNORMAL MG/DL
LEUKOCYTE ESTERASE UR QL STRIP: 500 /UL
NITRITE UR QL STRIP: NEGATIVE
PH UR STRIP: 6 PH (ref 5–9)
PROT UR QL STRIP: 30 MG/DL
RBC #/AREA URNS HPF: ABNORMAL /HPF (ref 0–2)
SERVICE COMMENT 03: ABNORMAL
SP GR UR STRIP: 1.02 (ref 1–1.03)
SPECIMEN COLLECTION METHOD, URINE: ABNORMAL
SQUAMOUS EPITHELIAL, UA: ABNORMAL /LPF
UROBILINOGEN UR STRIP-ACNC: NORMAL MG/DL
WBC #/AREA URNS HPF: ABNORMAL /HPF (ref 0–5)

## 2021-07-26 RX ORDER — NIFEDIPINE 10 MG/1
10 CAPSULE ORAL EVERY 6 HOURS
Qty: 30 CAPSULE | Refills: 2 | Status: SHIPPED | OUTPATIENT
Start: 2021-07-26 | End: 2021-08-18

## 2021-07-27 ENCOUNTER — TELEPHONE (OUTPATIENT)
Dept: OBSTETRICS AND GYNECOLOGY | Facility: CLINIC | Age: 26
End: 2021-07-27

## 2021-07-28 LAB — URINE CULTURE, ROUTINE: NORMAL

## 2021-08-03 ENCOUNTER — PROCEDURE VISIT (OUTPATIENT)
Dept: OBSTETRICS AND GYNECOLOGY | Facility: CLINIC | Age: 26
End: 2021-08-03
Payer: MEDICAID

## 2021-08-03 ENCOUNTER — ROUTINE PRENATAL (OUTPATIENT)
Dept: OBSTETRICS AND GYNECOLOGY | Facility: CLINIC | Age: 26
End: 2021-08-03
Payer: MEDICAID

## 2021-08-03 VITALS
WEIGHT: 240 LBS | DIASTOLIC BLOOD PRESSURE: 84 MMHG | SYSTOLIC BLOOD PRESSURE: 122 MMHG | HEART RATE: 98 BPM | BODY MASS INDEX: 42.51 KG/M2

## 2021-08-03 DIAGNOSIS — O30.002 TWIN GESTATION IN SECOND TRIMESTER, UNSPECIFIED MULTIPLE GESTATION TYPE: ICD-10-CM

## 2021-08-03 DIAGNOSIS — Z34.90 PREGNANCY, UNSPECIFIED GESTATIONAL AGE: Primary | ICD-10-CM

## 2021-08-03 DIAGNOSIS — O30.002 TWIN GESTATION IN SECOND TRIMESTER, UNSPECIFIED MULTIPLE GESTATION TYPE: Primary | ICD-10-CM

## 2021-08-03 DIAGNOSIS — Z34.90 PREGNANCY, UNSPECIFIED GESTATIONAL AGE: ICD-10-CM

## 2021-08-03 DIAGNOSIS — O30.003 TWIN GESTATION IN THIRD TRIMESTER, UNSPECIFIED MULTIPLE GESTATION TYPE: ICD-10-CM

## 2021-08-03 PROCEDURE — 76815 OB US LIMITED FETUS(S): CPT | Mod: S$GLB,,, | Performed by: OBSTETRICS & GYNECOLOGY

## 2021-08-03 PROCEDURE — 99212 OFFICE O/P EST SF 10 MIN: CPT | Mod: 25,TH,S$GLB, | Performed by: OBSTETRICS & GYNECOLOGY

## 2021-08-03 PROCEDURE — 76815 PR  US,PREGNANT UTERUS,LIMITED, 1/> FETUSES: ICD-10-PCS | Mod: S$GLB,,, | Performed by: OBSTETRICS & GYNECOLOGY

## 2021-08-03 PROCEDURE — 99212 PR OFFICE/OUTPT VISIT, EST, LEVL II, 10-19 MIN: ICD-10-PCS | Mod: 25,TH,S$GLB, | Performed by: OBSTETRICS & GYNECOLOGY

## 2021-08-03 RX ORDER — SERTRALINE HYDROCHLORIDE 50 MG/1
50 TABLET, FILM COATED ORAL DAILY
Qty: 90 TABLET | Refills: 1 | Status: SHIPPED | OUTPATIENT
Start: 2021-08-03 | End: 2022-07-14

## 2021-08-03 RX ORDER — BUSPIRONE HYDROCHLORIDE 10 MG/1
10 TABLET ORAL 2 TIMES DAILY
Qty: 180 TABLET | Refills: 2 | Status: SHIPPED | OUTPATIENT
Start: 2021-08-03 | End: 2022-03-14

## 2021-08-03 RX ORDER — SERTRALINE HYDROCHLORIDE 100 MG/1
100 TABLET, FILM COATED ORAL DAILY
Qty: 90 TABLET | Refills: 2 | Status: SHIPPED | OUTPATIENT
Start: 2021-08-03 | End: 2022-03-14 | Stop reason: SDUPTHER

## 2021-08-08 ENCOUNTER — PATIENT MESSAGE (OUTPATIENT)
Dept: OBSTETRICS AND GYNECOLOGY | Facility: CLINIC | Age: 26
End: 2021-08-08

## 2021-08-09 DIAGNOSIS — Z3A.28 28 WEEKS GESTATION OF PREGNANCY: Primary | ICD-10-CM

## 2021-08-17 LAB
ABS NRBC COUNT: 0 X 10 3/UL (ref 0–0.01)
ABSOLUTE BASOPHIL: 0.04 X 10 3/UL (ref 0–0.22)
ABSOLUTE EOSINOPHIL: 0.11 X 10 3/UL (ref 0.04–0.54)
ABSOLUTE IMMATURE GRAN: 0.09 X 10 3/UL (ref 0–0.04)
ABSOLUTE LYMPHOCYTE: 2.92 X 10 3/UL (ref 0.86–4.75)
ABSOLUTE MONOCYTE: 0.99 X 10 3/UL (ref 0.22–1.08)
BASOPHILS NFR BLD: 0.3 % (ref 0.2–1.2)
EOSINOPHIL NFR BLD: 0.9 % (ref 0.7–7)
GLUCOSE 1 HR POST 50 GM: 81 MG/DL
HCT VFR BLD AUTO: 29.3 % (ref 37–47)
HGB BLD-MCNC: 8.3 G/DL (ref 12–16)
IMMATURE GRANULOCYTES: 0.7 % (ref 0–0.5)
LYMPHOCYTES NFR BLD: 23 % (ref 19.3–53.1)
MCH RBC QN AUTO: 21.6 PG (ref 27–32)
MCHC RBC AUTO-ENTMCNC: 28.3 G/DL (ref 32–36)
MCV RBC AUTO: 76.1 FL (ref 82–100)
MONOCYTES NFR BLD: 7.8 % (ref 4.7–12.5)
NEUTROPHILS # BLD AUTO: 8.55 X 10 3/UL (ref 2.15–7.56)
NEUTROPHILS NFR BLD: 67.3 %
NUCLEATED RED BLOOD CELLS: 0 /100 WBC (ref 0–0.2)
PLATELET # BLD AUTO: 257 X 10 3/UL (ref 135–400)
RBC # BLD AUTO: 3.85 X 10 6/UL (ref 4.2–5.4)
RDW-SD: 47.5 FL (ref 37–54)
T4, FREE: 0.93 NG/DL (ref 0.93–1.7)
TSH SERPL DL<=0.005 MIU/L-ACNC: 0.87 UIU/ML (ref 0.27–4.2)
WBC # BLD: 12.7 X 10 3/UL (ref 4.3–10.8)

## 2021-08-18 ENCOUNTER — PROCEDURE VISIT (OUTPATIENT)
Dept: OBSTETRICS AND GYNECOLOGY | Facility: CLINIC | Age: 26
End: 2021-08-18
Payer: MEDICAID

## 2021-08-18 ENCOUNTER — ROUTINE PRENATAL (OUTPATIENT)
Dept: OBSTETRICS AND GYNECOLOGY | Facility: CLINIC | Age: 26
End: 2021-08-18
Payer: MEDICAID

## 2021-08-18 VITALS
HEART RATE: 105 BPM | DIASTOLIC BLOOD PRESSURE: 84 MMHG | BODY MASS INDEX: 43.93 KG/M2 | WEIGHT: 248 LBS | SYSTOLIC BLOOD PRESSURE: 131 MMHG

## 2021-08-18 DIAGNOSIS — Z34.90 PREGNANCY, UNSPECIFIED GESTATIONAL AGE: Primary | ICD-10-CM

## 2021-08-18 DIAGNOSIS — O30.003 TWIN GESTATION IN THIRD TRIMESTER, UNSPECIFIED MULTIPLE GESTATION TYPE: ICD-10-CM

## 2021-08-18 DIAGNOSIS — O30.003 TWIN GESTATION IN THIRD TRIMESTER, UNSPECIFIED MULTIPLE GESTATION TYPE: Primary | ICD-10-CM

## 2021-08-18 PROCEDURE — 99212 PR OFFICE/OUTPT VISIT, EST, LEVL II, 10-19 MIN: ICD-10-PCS | Mod: TH,S$GLB,, | Performed by: OBSTETRICS & GYNECOLOGY

## 2021-08-18 PROCEDURE — 99212 OFFICE O/P EST SF 10 MIN: CPT | Mod: TH,S$GLB,, | Performed by: OBSTETRICS & GYNECOLOGY

## 2021-08-18 PROCEDURE — 76816 PR  US,PREGNANT UTERUS,F/U,TRANSABD APP: ICD-10-PCS | Mod: 22,S$GLB,, | Performed by: OBSTETRICS & GYNECOLOGY

## 2021-08-18 PROCEDURE — 76816 OB US FOLLOW-UP PER FETUS: CPT | Mod: 22,S$GLB,, | Performed by: OBSTETRICS & GYNECOLOGY

## 2021-08-24 ENCOUNTER — PATIENT MESSAGE (OUTPATIENT)
Dept: OBSTETRICS AND GYNECOLOGY | Facility: CLINIC | Age: 26
End: 2021-08-24

## 2021-08-24 LAB
APPEARANCE, UA: CLEAR
BACTERIA SPEC CULT: ABNORMAL /HPF
BILIRUB UR QL STRIP: NEGATIVE MG/DL
COLOR UR: YELLOW
GLUCOSE (UA): 250 MG/DL
HGB UR QL STRIP: NEGATIVE /UL
KETONES UR QL STRIP: ABNORMAL MG/DL
LEUKOCYTE ESTERASE UR QL STRIP: 100 /UL
NITRITE UR QL STRIP: NEGATIVE
PH UR STRIP: 6 PH (ref 5–9)
PROT UR QL STRIP: 30 MG/DL
RBC #/AREA URNS HPF: ABNORMAL /HPF (ref 0–2)
SERVICE COMMENT 03: ABNORMAL
SP GR UR STRIP: 1.02 (ref 1–1.03)
SPECIMEN COLLECTION METHOD, URINE: ABNORMAL
SQUAMOUS EPITHELIAL, UA: ABNORMAL /LPF
UROBILINOGEN UR STRIP-ACNC: NORMAL MG/DL
WBC #/AREA URNS HPF: ABNORMAL /HPF (ref 0–5)

## 2021-08-25 DIAGNOSIS — O23.43 UTI IN PREGNANCY, THIRD TRIMESTER: Primary | ICD-10-CM

## 2021-08-25 RX ORDER — NITROFURANTOIN 25; 75 MG/1; MG/1
100 CAPSULE ORAL 2 TIMES DAILY
Qty: 14 CAPSULE | Refills: 0 | Status: SHIPPED | OUTPATIENT
Start: 2021-08-25 | End: 2021-09-01

## 2021-08-26 LAB — URINE CULTURE, ROUTINE: NORMAL

## 2021-09-01 ENCOUNTER — PROCEDURE VISIT (OUTPATIENT)
Dept: OBSTETRICS AND GYNECOLOGY | Facility: CLINIC | Age: 26
End: 2021-09-01
Payer: MEDICAID

## 2021-09-01 ENCOUNTER — CLINICAL SUPPORT (OUTPATIENT)
Dept: OBSTETRICS AND GYNECOLOGY | Facility: CLINIC | Age: 26
End: 2021-09-01

## 2021-09-01 VITALS
HEART RATE: 82 BPM | DIASTOLIC BLOOD PRESSURE: 58 MMHG | SYSTOLIC BLOOD PRESSURE: 95 MMHG | WEIGHT: 254.81 LBS | BODY MASS INDEX: 45.14 KG/M2

## 2021-09-01 DIAGNOSIS — O30.003 TWIN GESTATION IN THIRD TRIMESTER, UNSPECIFIED MULTIPLE GESTATION TYPE: ICD-10-CM

## 2021-09-01 DIAGNOSIS — Z3A.31 31 WEEKS GESTATION OF PREGNANCY: Primary | ICD-10-CM

## 2021-09-01 PROCEDURE — 76816 PR  US,PREGNANT UTERUS,F/U,TRANSABD APP: ICD-10-PCS | Mod: 22,S$GLB,, | Performed by: OBSTETRICS & GYNECOLOGY

## 2021-09-01 PROCEDURE — 76816 OB US FOLLOW-UP PER FETUS: CPT | Mod: 22,S$GLB,, | Performed by: OBSTETRICS & GYNECOLOGY

## 2021-09-08 ENCOUNTER — TELEPHONE (OUTPATIENT)
Dept: OBSTETRICS AND GYNECOLOGY | Facility: CLINIC | Age: 26
End: 2021-09-08

## 2021-09-08 DIAGNOSIS — N39.0 URINARY TRACT INFECTION WITHOUT HEMATURIA, SITE UNSPECIFIED: Primary | ICD-10-CM

## 2021-09-08 DIAGNOSIS — Z34.90 PREGNANCY, UNSPECIFIED GESTATIONAL AGE: Primary | ICD-10-CM

## 2021-09-08 DIAGNOSIS — O30.003 TWIN GESTATION IN THIRD TRIMESTER, UNSPECIFIED MULTIPLE GESTATION TYPE: ICD-10-CM

## 2021-09-08 LAB
AMNISURE ROM QUALITATIVE: NORMAL
APPEARANCE, UA: ABNORMAL
BACTERIA SPEC CULT: ABNORMAL /HPF
BILIRUB UR QL STRIP: NEGATIVE MG/DL
COLOR UR: ABNORMAL
GLUCOSE (UA): NORMAL MG/DL
HGB UR QL STRIP: 10 /UL
KETONES UR QL STRIP: NEGATIVE MG/DL
LEUKOCYTE ESTERASE UR QL STRIP: 500 /UL
NITRITE UR QL STRIP: NEGATIVE
PH UR STRIP: 6.5 PH (ref 5–9)
PROT UR QL STRIP: 30 MG/DL
RBC #/AREA URNS HPF: ABNORMAL /HPF (ref 0–2)
SERVICE COMMENT 03: ABNORMAL
SP GR UR STRIP: 1.01 (ref 1–1.03)
SPECIMEN COLLECTION METHOD, URINE: ABNORMAL
SQUAMOUS EPITHELIAL, UA: ABNORMAL /LPF
UROBILINOGEN UR STRIP-ACNC: NORMAL MG/DL
WBC #/AREA URNS HPF: ABNORMAL /HPF (ref 0–5)

## 2021-09-08 RX ORDER — CEPHALEXIN 500 MG/1
500 CAPSULE ORAL 3 TIMES DAILY
Qty: 21 CAPSULE | Refills: 0 | Status: SHIPPED | OUTPATIENT
Start: 2021-09-08 | End: 2021-09-15

## 2021-09-10 ENCOUNTER — PATIENT MESSAGE (OUTPATIENT)
Dept: OBSTETRICS AND GYNECOLOGY | Facility: CLINIC | Age: 26
End: 2021-09-10

## 2021-09-13 DIAGNOSIS — L29.9 ITCHING: Primary | ICD-10-CM

## 2021-09-13 RX ORDER — HYDROXYZINE PAMOATE 25 MG/1
25 CAPSULE ORAL EVERY 8 HOURS PRN
Qty: 28 CAPSULE | Refills: 0 | Status: SHIPPED | OUTPATIENT
Start: 2021-09-13 | End: 2022-03-14

## 2021-09-15 ENCOUNTER — TELEPHONE (OUTPATIENT)
Dept: OBSTETRICS AND GYNECOLOGY | Facility: CLINIC | Age: 26
End: 2021-09-15

## 2021-09-15 ENCOUNTER — PATIENT MESSAGE (OUTPATIENT)
Dept: OBSTETRICS AND GYNECOLOGY | Facility: CLINIC | Age: 26
End: 2021-09-15

## 2021-09-16 ENCOUNTER — ROUTINE PRENATAL (OUTPATIENT)
Dept: OBSTETRICS AND GYNECOLOGY | Facility: CLINIC | Age: 26
End: 2021-09-16

## 2021-09-16 ENCOUNTER — PROCEDURE VISIT (OUTPATIENT)
Dept: OBSTETRICS AND GYNECOLOGY | Facility: CLINIC | Age: 26
End: 2021-09-16
Payer: MEDICAID

## 2021-09-16 VITALS
SYSTOLIC BLOOD PRESSURE: 138 MMHG | BODY MASS INDEX: 45.7 KG/M2 | WEIGHT: 258 LBS | DIASTOLIC BLOOD PRESSURE: 97 MMHG | HEART RATE: 84 BPM

## 2021-09-16 DIAGNOSIS — Z34.90 PREGNANCY, UNSPECIFIED GESTATIONAL AGE: Primary | ICD-10-CM

## 2021-09-16 DIAGNOSIS — Z34.90 PREGNANCY, UNSPECIFIED GESTATIONAL AGE: ICD-10-CM

## 2021-09-16 LAB
ALBUMIN SERPL BCP-MCNC: 2.4 G/DL (ref 3.4–5)
ALBUMIN/GLOBULIN RATIO: 0.75 RATIO (ref 1.1–1.8)
ALP SERPL-CCNC: 110 U/L (ref 46–116)
ALT SERPL W P-5'-P-CCNC: 11 U/L (ref 12–78)
ANION GAP SERPL CALC-SCNC: 9 MMOL/L (ref 3–11)
ANISOCYTOSIS: NORMAL
APPEARANCE, UA: ABNORMAL
AST SERPL-CCNC: 13 U/L (ref 15–37)
BACTERIA SPEC CULT: ABNORMAL /HPF
BASOPHILS NFR BLD: 0.5 % (ref 0–3)
BILIRUB SERPL-MCNC: 0.3 MG/DL (ref 0–1)
BILIRUB UR QL STRIP: NEGATIVE MG/DL
BUN SERPL-MCNC: 8 MG/DL (ref 7–18)
BUN/CREAT SERPL: 9.52 RATIO (ref 7–18)
CALCIUM SERPL-MCNC: 7.8 MG/DL (ref 8.8–10.5)
CHLORIDE SERPL-SCNC: 106 MMOL/L (ref 100–108)
CO2 SERPL-SCNC: 24 MMOL/L (ref 21–32)
COLOR UR: ABNORMAL
CREAT SERPL-MCNC: 0.84 MG/DL (ref 0.55–1.02)
EOSINOPHIL NFR BLD: 0.7 % (ref 1–3)
ERYTHROCYTE [DISTWIDTH] IN BLOOD BY AUTOMATED COUNT: 18.8 % (ref 12.5–18)
GFR ESTIMATION: > 60
GLOBULIN: 3.2 G/DL (ref 2.3–3.5)
GLUCOSE (UA): NORMAL MG/DL
GLUCOSE SERPL-MCNC: 115 MG/DL (ref 70–110)
HCT VFR BLD AUTO: 28.6 % (ref 37–47)
HGB BLD-MCNC: 8.2 G/DL (ref 12–16)
HGB UR QL STRIP: 50 /UL
HYPOCHROMIA BLD QL SMEAR: NORMAL
KETONES UR QL STRIP: ABNORMAL MG/DL
LDH SERPL L TO P-CCNC: 178 U/L (ref 82–234)
LEUKOCYTE ESTERASE UR QL STRIP: 100 /UL
LYMPHOCYTES NFR BLD: 22.9 % (ref 25–40)
MCH RBC QN AUTO: 21.3 PG (ref 27–31.2)
MCHC RBC AUTO-ENTMCNC: 28.7 G/DL (ref 31.8–35.4)
MCV RBC AUTO: 74.3 FL (ref 80–97)
MICROCYTES BLD QL SMEAR: NORMAL
MONOCYTES NFR BLD: 5.8 % (ref 1–15)
NEUTROPHILS # BLD AUTO: 8.94 10*3/UL (ref 1.8–7.7)
NEUTROPHILS NFR BLD: 69.2 % (ref 37–80)
NITRITE UR QL STRIP: NEGATIVE
NUCLEATED RED BLOOD CELLS: 0.2 %
PH UR STRIP: 6 PH (ref 5–9)
PLATELETS: 236 10*3/UL (ref 142–424)
POTASSIUM SERPL-SCNC: 4 MMOL/L (ref 3.6–5.2)
PROT SERPL-MCNC: 5.6 G/DL (ref 6.4–8.2)
PROT UR QL STRIP: 30 MG/DL
RBC # BLD AUTO: 3.85 10*6/UL (ref 4.2–5.4)
RBC #/AREA URNS HPF: ABNORMAL /HPF (ref 0–2)
SERVICE COMMENT 03: ABNORMAL
SODIUM BLD-SCNC: 139 MMOL/L (ref 135–145)
SP GR UR STRIP: 1.02 (ref 1–1.03)
SPECIMEN COLLECTION METHOD, URINE: ABNORMAL
SQUAMOUS EPITHELIAL, UA: ABNORMAL /LPF
URATE SERPL-MCNC: 5.4 MG/DL (ref 2.6–6)
UROBILINOGEN UR STRIP-ACNC: NORMAL MG/DL
WBC # BLD: 12.9 10*3/UL (ref 4.6–10.2)
WBC #/AREA URNS HPF: ABNORMAL /HPF (ref 0–5)

## 2021-09-16 PROCEDURE — 76816 PR  US,PREGNANT UTERUS,F/U,TRANSABD APP: ICD-10-PCS | Mod: 22,S$GLB,, | Performed by: OBSTETRICS & GYNECOLOGY

## 2021-09-16 PROCEDURE — 76816 OB US FOLLOW-UP PER FETUS: CPT | Mod: 22,S$GLB,, | Performed by: OBSTETRICS & GYNECOLOGY

## 2021-09-16 PROCEDURE — 99212 OFFICE O/P EST SF 10 MIN: CPT | Mod: 25,TH,S$GLB, | Performed by: OBSTETRICS & GYNECOLOGY

## 2021-09-16 PROCEDURE — 99212 PR OFFICE/OUTPT VISIT, EST, LEVL II, 10-19 MIN: ICD-10-PCS | Mod: 25,TH,S$GLB, | Performed by: OBSTETRICS & GYNECOLOGY

## 2021-09-17 LAB
ALBUMIN SERPL BCP-MCNC: 2.3 G/DL (ref 3.4–5)
ALBUMIN/GLOBULIN RATIO: 0.66 RATIO (ref 1.1–1.8)
ALP SERPL-CCNC: 106 U/L (ref 46–116)
ALT SERPL W P-5'-P-CCNC: 9 U/L (ref 12–78)
ANION GAP SERPL CALC-SCNC: 7 MMOL/L (ref 3–11)
ANISOCYTOSIS: NORMAL
AST SERPL-CCNC: 13 U/L (ref 15–37)
BASOPHILS NFR BLD: 0.3 % (ref 0–3)
BILIRUB SERPL-MCNC: 0.3 MG/DL (ref 0–1)
BUN SERPL-MCNC: 12 MG/DL (ref 7–18)
BUN/CREAT SERPL: 12.37 RATIO (ref 7–18)
CALCIUM SERPL-MCNC: 7.9 MG/DL (ref 8.8–10.5)
CHLORIDE SERPL-SCNC: 107 MMOL/L (ref 100–108)
CO2 SERPL-SCNC: 25 MMOL/L (ref 21–32)
CREAT SERPL-MCNC: 0.97 MG/DL (ref 0.55–1.02)
EOSINOPHIL NFR BLD: 0.4 % (ref 1–3)
ERYTHROCYTE [DISTWIDTH] IN BLOOD BY AUTOMATED COUNT: 18.9 % (ref 12.5–18)
GFR ESTIMATION: > 60
GLOBULIN: 3.5 G/DL (ref 2.3–3.5)
GLUCOSE SERPL-MCNC: 107 MG/DL (ref 70–110)
HCT VFR BLD AUTO: 28.8 % (ref 37–47)
HGB BLD-MCNC: 8.1 G/DL (ref 12–16)
HYPOCHROMIA BLD QL SMEAR: NORMAL
LDH SERPL L TO P-CCNC: 208 U/L (ref 82–234)
LYMPHOCYTES NFR BLD: 25.6 % (ref 25–40)
Lab: 24 HR
MCH RBC QN AUTO: 20.9 PG (ref 27–31.2)
MCHC RBC AUTO-ENTMCNC: 28.1 G/DL (ref 31.8–35.4)
MCV RBC AUTO: 74.4 FL (ref 80–97)
MICROCYTES BLD QL SMEAR: NORMAL
MONOCYTES NFR BLD: 4.8 % (ref 1–15)
NEUTROPHILS # BLD AUTO: 8.36 10*3/UL (ref 1.8–7.7)
NEUTROPHILS NFR BLD: 68.2 % (ref 37–80)
NUCLEATED RED BLOOD CELLS: 0.3 %
PLATELETS: 217 10*3/UL (ref 142–424)
POTASSIUM SERPL-SCNC: 4.6 MMOL/L (ref 3.6–5.2)
PROT 24H UR-MRATE: 396 MG/24HR (ref 0–150)
PROT SERPL-MCNC: 5.8 G/DL (ref 6.4–8.2)
PROTEIN URINE (QUAL): 30
PROTEIN, URINE RAW: 44 MG/DL (ref 0–15)
RBC # BLD AUTO: 3.87 10*6/UL (ref 4.2–5.4)
SODIUM BLD-SCNC: 139 MMOL/L (ref 135–145)
SPECIMEN VOLUME: 900 ML/DAY
URATE SERPL-MCNC: 5.1 MG/DL (ref 2.6–6)
WBC # BLD: 12.3 10*3/UL (ref 4.6–10.2)

## 2021-09-18 ENCOUNTER — OUTSIDE PLACE OF SERVICE (OUTPATIENT)
Dept: OBSTETRICS AND GYNECOLOGY | Facility: CLINIC | Age: 26
End: 2021-09-18
Payer: MEDICAID

## 2021-09-18 LAB — URINE CULTURE, ROUTINE: NORMAL

## 2021-09-18 PROCEDURE — 99219 PR INITIAL OBSERVATION CARE,LEVL II: CPT | Mod: ,,, | Performed by: OBSTETRICS & GYNECOLOGY

## 2021-09-18 PROCEDURE — 99219 PR INITIAL OBSERVATION CARE,LEVL II: ICD-10-PCS | Mod: ,,, | Performed by: OBSTETRICS & GYNECOLOGY

## 2021-09-19 ENCOUNTER — OUTSIDE PLACE OF SERVICE (OUTPATIENT)
Dept: OBSTETRICS AND GYNECOLOGY | Facility: CLINIC | Age: 26
End: 2021-09-19
Payer: MEDICAID

## 2021-09-19 LAB
ANISOCYTOSIS: NORMAL
APTT PPP: 22.8 SEC (ref 23.6–36.4)
BASOPHILS NFR BLD: 0.2 % (ref 0–3)
CALCIUM BLD-MCNC: NEGATIVE MG/DL
COVID-19 AB, IGM: NEGATIVE
EOSINOPHIL NFR BLD: 0 % (ref 1–3)
ERYTHROCYTE [DISTWIDTH] IN BLOOD BY AUTOMATED COUNT: 19.2 % (ref 12.5–18)
HCT VFR BLD AUTO: 28.8 % (ref 37–47)
HGB BLD-MCNC: 8.1 G/DL (ref 12–16)
HYPOCHROMIA BLD QL SMEAR: NORMAL
INR PPP: 0.9 INR (ref 0.9–1.1)
LYMPHOCYTES NFR BLD: 11.8 % (ref 25–40)
MCH RBC QN AUTO: 20.9 PG (ref 27–31.2)
MCHC RBC AUTO-ENTMCNC: 28.1 G/DL (ref 31.8–35.4)
MCV RBC AUTO: 74.4 FL (ref 80–97)
MICROCYTES BLD QL SMEAR: NORMAL
MONOCYTES NFR BLD: 5.3 % (ref 1–15)
NEUTROPHILS # BLD AUTO: 11.58 10*3/UL (ref 1.8–7.7)
NEUTROPHILS NFR BLD: 80.1 % (ref 37–80)
NUCLEATED RED BLOOD CELLS: 0.3 %
PLATELETS: 221 10*3/UL (ref 142–424)
PROTHROMBIN TIME: 10.8 SEC (ref 10.4–12.8)
RBC # BLD AUTO: 3.87 10*6/UL (ref 4.2–5.4)
WBC # BLD: 14.5 10*3/UL (ref 4.6–10.2)

## 2021-09-19 PROCEDURE — 59514 PR CESAREAN DELIVERY ONLY: ICD-10-PCS | Mod: 22,AT,, | Performed by: OBSTETRICS & GYNECOLOGY

## 2021-09-19 PROCEDURE — 59514 CESAREAN DELIVERY ONLY: CPT | Mod: 22,AT,, | Performed by: OBSTETRICS & GYNECOLOGY

## 2021-09-19 PROCEDURE — 99225 PR SUBSEQUENT OBSERVATION CARE,LEVEL II: CPT | Mod: ,,, | Performed by: OBSTETRICS & GYNECOLOGY

## 2021-09-19 PROCEDURE — 99225 PR SUBSEQUENT OBSERVATION CARE,LEVEL II: ICD-10-PCS | Mod: ,,, | Performed by: OBSTETRICS & GYNECOLOGY

## 2021-09-20 ENCOUNTER — TELEPHONE (OUTPATIENT)
Dept: OBSTETRICS AND GYNECOLOGY | Facility: CLINIC | Age: 26
End: 2021-09-20

## 2021-09-20 DIAGNOSIS — Z34.90 PREGNANCY, UNSPECIFIED GESTATIONAL AGE: Primary | ICD-10-CM

## 2021-09-20 LAB
ERYTHROCYTE [DISTWIDTH] IN BLOOD BY AUTOMATED COUNT: 19.3 % (ref 12.5–18)
HCT VFR BLD AUTO: 23.7 % (ref 37–47)
HGB BLD-MCNC: 6.7 G/DL (ref 12–16)
MCH RBC QN AUTO: 21 PG (ref 27–31.2)
MCHC RBC AUTO-ENTMCNC: 28.3 G/DL (ref 31.8–35.4)
MCV RBC AUTO: 74.3 FL (ref 80–97)
NUCLEATED RED BLOOD CELLS: 0.2 %
PLATELETS: 205 10*3/UL (ref 142–424)
RBC # BLD AUTO: 3.19 10*6/UL (ref 4.2–5.4)
RPR: NON REACTIVE
WBC # BLD: 19.5 10*3/UL (ref 4.6–10.2)

## 2021-09-23 ENCOUNTER — PATIENT MESSAGE (OUTPATIENT)
Dept: OBSTETRICS AND GYNECOLOGY | Facility: CLINIC | Age: 26
End: 2021-09-23

## 2021-09-23 LAB — SPECIMEN TO PATHOLOGY: NORMAL

## 2021-09-24 ENCOUNTER — PATIENT MESSAGE (OUTPATIENT)
Dept: OBSTETRICS AND GYNECOLOGY | Facility: CLINIC | Age: 26
End: 2021-09-24

## 2021-09-24 DIAGNOSIS — I10 HYPERTENSION, UNSPECIFIED TYPE: Primary | ICD-10-CM

## 2021-09-24 RX ORDER — LABETALOL 200 MG/1
200 TABLET, FILM COATED ORAL 2 TIMES DAILY
Qty: 60 TABLET | Refills: 11 | Status: SHIPPED | OUTPATIENT
Start: 2021-09-24 | End: 2022-03-14

## 2021-09-28 ENCOUNTER — PATIENT MESSAGE (OUTPATIENT)
Dept: OBSTETRICS AND GYNECOLOGY | Facility: CLINIC | Age: 26
End: 2021-09-28

## 2021-09-29 ENCOUNTER — POSTPARTUM VISIT (OUTPATIENT)
Dept: OBSTETRICS AND GYNECOLOGY | Facility: CLINIC | Age: 26
End: 2021-09-29
Payer: MEDICAID

## 2021-09-29 VITALS
HEART RATE: 84 BPM | DIASTOLIC BLOOD PRESSURE: 88 MMHG | HEIGHT: 63 IN | WEIGHT: 229 LBS | BODY MASS INDEX: 40.57 KG/M2 | SYSTOLIC BLOOD PRESSURE: 123 MMHG

## 2021-09-29 DIAGNOSIS — Z98.890 POST-OPERATIVE STATE: Primary | ICD-10-CM

## 2021-09-29 PROCEDURE — 99499 UNLISTED E&M SERVICE: CPT | Mod: S$GLB,,, | Performed by: OBSTETRICS & GYNECOLOGY

## 2021-09-29 PROCEDURE — 99499 NO LOS: ICD-10-PCS | Mod: S$GLB,,, | Performed by: OBSTETRICS & GYNECOLOGY

## 2021-09-30 ENCOUNTER — PATIENT MESSAGE (OUTPATIENT)
Dept: OBSTETRICS AND GYNECOLOGY | Facility: CLINIC | Age: 26
End: 2021-09-30

## 2021-10-06 ENCOUNTER — PATIENT MESSAGE (OUTPATIENT)
Dept: OBSTETRICS AND GYNECOLOGY | Facility: CLINIC | Age: 26
End: 2021-10-06

## 2021-10-06 DIAGNOSIS — R51.9 INCREASED FREQUENCY OF HEADACHES: Primary | ICD-10-CM

## 2021-10-06 RX ORDER — BUTALBITAL, ACETAMINOPHEN AND CAFFEINE 50; 325; 40 MG/1; MG/1; MG/1
1 CAPSULE ORAL EVERY 6 HOURS PRN
Qty: 20 CAPSULE | Refills: 0 | Status: SHIPPED | OUTPATIENT
Start: 2021-10-06 | End: 2022-03-14

## 2021-11-01 ENCOUNTER — OFFICE VISIT (OUTPATIENT)
Dept: OBSTETRICS AND GYNECOLOGY | Facility: CLINIC | Age: 26
End: 2021-11-01
Payer: MEDICAID

## 2021-11-01 VITALS
WEIGHT: 229 LBS | DIASTOLIC BLOOD PRESSURE: 83 MMHG | SYSTOLIC BLOOD PRESSURE: 132 MMHG | BODY MASS INDEX: 40.57 KG/M2 | HEART RATE: 80 BPM

## 2021-11-01 PROCEDURE — 0503F POSTPARTUM CARE VISIT: CPT | Mod: S$GLB,,, | Performed by: OBSTETRICS & GYNECOLOGY

## 2021-11-01 PROCEDURE — 0503F PR POSTPARTUM CARE VISIT: ICD-10-PCS | Mod: S$GLB,,, | Performed by: OBSTETRICS & GYNECOLOGY

## 2021-11-01 RX ORDER — BUPROPION HYDROCHLORIDE 150 MG/1
150 TABLET ORAL EVERY MORNING
Qty: 30 TABLET | Refills: 2 | Status: SHIPPED | OUTPATIENT
Start: 2021-11-01 | End: 2022-01-24

## 2021-11-01 RX ORDER — ETONOGESTREL AND ETHINYL ESTRADIOL VAGINAL RING .015; .12 MG/D; MG/D
1 RING VAGINAL
Qty: 3 EACH | Refills: 4 | Status: SHIPPED | OUTPATIENT
Start: 2021-11-01 | End: 2022-03-14 | Stop reason: SDUPTHER

## 2021-11-30 ENCOUNTER — PATIENT MESSAGE (OUTPATIENT)
Dept: OBSTETRICS AND GYNECOLOGY | Facility: CLINIC | Age: 26
End: 2021-11-30
Payer: MEDICAID

## 2021-12-01 DIAGNOSIS — Z78.9 USES BIRTH CONTROL: Primary | ICD-10-CM

## 2021-12-01 RX ORDER — ETONOGESTREL AND ETHINYL ESTRADIOL VAGINAL RING .015; .12 MG/D; MG/D
1 RING VAGINAL
Qty: 3 EACH | Refills: 4 | Status: SHIPPED | OUTPATIENT
Start: 2021-12-01 | End: 2022-03-14

## 2022-03-01 ENCOUNTER — PATIENT MESSAGE (OUTPATIENT)
Dept: OBSTETRICS AND GYNECOLOGY | Facility: CLINIC | Age: 27
End: 2022-03-01
Payer: MEDICAID

## 2022-03-02 DIAGNOSIS — B37.9 YEAST INFECTION: Primary | ICD-10-CM

## 2022-03-02 RX ORDER — FLUCONAZOLE 150 MG/1
150 TABLET ORAL EVERY OTHER DAY
Qty: 2 TABLET | Refills: 2 | Status: SHIPPED | OUTPATIENT
Start: 2022-03-02 | End: 2022-03-05

## 2022-03-02 NOTE — TELEPHONE ENCOUNTER
Good morning Sachi,     I will submit a medication request to Dr. Wagner for Diflucan.  Please check with your Pharmacy after 5:00 today.    Paty   ===View-only below this line===      ----- Message -----       From:Sachi Loaiza       Sent:3/1/2022  7:16 PM CST         To:Magdy Wagner III, MD    Subject:Yeast infection    I have been having an on and off yeast infection for the past few months. I have tried several medications over the counter that help for a few days and then the yeast infection comes back. Is there something that could be called out for me?    Thanks in advance!

## 2022-03-14 ENCOUNTER — OFFICE VISIT (OUTPATIENT)
Dept: OBSTETRICS AND GYNECOLOGY | Facility: CLINIC | Age: 27
End: 2022-03-14
Payer: MEDICAID

## 2022-03-14 VITALS
HEART RATE: 89 BPM | WEIGHT: 221 LBS | SYSTOLIC BLOOD PRESSURE: 135 MMHG | BODY MASS INDEX: 39.15 KG/M2 | DIASTOLIC BLOOD PRESSURE: 88 MMHG

## 2022-03-14 DIAGNOSIS — F32.A ANXIETY AND DEPRESSION: ICD-10-CM

## 2022-03-14 DIAGNOSIS — F41.9 ANXIETY AND DEPRESSION: ICD-10-CM

## 2022-03-14 DIAGNOSIS — R53.83 OTHER FATIGUE: ICD-10-CM

## 2022-03-14 DIAGNOSIS — G44.229 CHRONIC TENSION-TYPE HEADACHE, NOT INTRACTABLE: ICD-10-CM

## 2022-03-14 DIAGNOSIS — N94.6 DYSMENORRHEA: ICD-10-CM

## 2022-03-14 DIAGNOSIS — Z01.419 WELL WOMAN EXAM WITH ROUTINE GYNECOLOGICAL EXAM: Primary | ICD-10-CM

## 2022-03-14 PROCEDURE — 3079F PR MOST RECENT DIASTOLIC BLOOD PRESSURE 80-89 MM HG: ICD-10-PCS | Mod: CPTII,S$GLB,, | Performed by: OBSTETRICS & GYNECOLOGY

## 2022-03-14 PROCEDURE — 99395 PREV VISIT EST AGE 18-39: CPT | Mod: S$GLB,,, | Performed by: OBSTETRICS & GYNECOLOGY

## 2022-03-14 PROCEDURE — 3075F PR MOST RECENT SYSTOLIC BLOOD PRESS GE 130-139MM HG: ICD-10-PCS | Mod: CPTII,S$GLB,, | Performed by: OBSTETRICS & GYNECOLOGY

## 2022-03-14 PROCEDURE — 99395 PR PREVENTIVE VISIT,EST,18-39: ICD-10-PCS | Mod: S$GLB,,, | Performed by: OBSTETRICS & GYNECOLOGY

## 2022-03-14 PROCEDURE — 3008F PR BODY MASS INDEX (BMI) DOCUMENTED: ICD-10-PCS | Mod: CPTII,S$GLB,, | Performed by: OBSTETRICS & GYNECOLOGY

## 2022-03-14 PROCEDURE — 3075F SYST BP GE 130 - 139MM HG: CPT | Mod: CPTII,S$GLB,, | Performed by: OBSTETRICS & GYNECOLOGY

## 2022-03-14 PROCEDURE — 1159F PR MEDICATION LIST DOCUMENTED IN MEDICAL RECORD: ICD-10-PCS | Mod: CPTII,S$GLB,, | Performed by: OBSTETRICS & GYNECOLOGY

## 2022-03-14 PROCEDURE — 1159F MED LIST DOCD IN RCRD: CPT | Mod: CPTII,S$GLB,, | Performed by: OBSTETRICS & GYNECOLOGY

## 2022-03-14 PROCEDURE — 3008F BODY MASS INDEX DOCD: CPT | Mod: CPTII,S$GLB,, | Performed by: OBSTETRICS & GYNECOLOGY

## 2022-03-14 PROCEDURE — 3079F DIAST BP 80-89 MM HG: CPT | Mod: CPTII,S$GLB,, | Performed by: OBSTETRICS & GYNECOLOGY

## 2022-03-14 RX ORDER — ETONOGESTREL AND ETHINYL ESTRADIOL VAGINAL RING .015; .12 MG/D; MG/D
1 RING VAGINAL
Qty: 3 EACH | Refills: 4 | Status: SHIPPED | OUTPATIENT
Start: 2022-03-14 | End: 2022-07-14

## 2022-03-14 RX ORDER — BUPROPION HYDROCHLORIDE 150 MG/1
150 TABLET ORAL DAILY
Qty: 90 TABLET | Refills: 3 | Status: SHIPPED | OUTPATIENT
Start: 2022-03-14 | End: 2022-05-05 | Stop reason: SDUPTHER

## 2022-03-14 RX ORDER — SUMATRIPTAN SUCCINATE 100 MG/1
100 TABLET ORAL ONCE AS NEEDED
Qty: 18 TABLET | Refills: 2 | Status: SHIPPED | OUTPATIENT
Start: 2022-03-14 | End: 2023-01-18

## 2022-03-14 RX ORDER — SERTRALINE HYDROCHLORIDE 100 MG/1
100 TABLET, FILM COATED ORAL DAILY
Qty: 90 TABLET | Refills: 2 | Status: SHIPPED | OUTPATIENT
Start: 2022-03-14 | End: 2022-07-14

## 2022-03-14 NOTE — PROGRESS NOTES
Subjective:       Patient ID: Sachi Loaiza is a 26 y.o. female.    Chief Complaint:  Well Woman (PT C/O HEADACHES SINCE GIVING BIRTH, SHE STATES THEY SEEM TO BE GETTING WORSE AS TIME GOES ON. SHE ALSO C/O FATIGUE. )      History of Present Illness  She is doing well.  No new health issues,  No abnormal bleeding, No GI or Gu concerns,  No dyspareunia  She is on the zoloft and wellbutrin  She is doing great and no concerns.   She has her adoptive child and her twins  Continue her meds discussed   She agrees for now   She is not on birth control    I have sent out the nuvring but linnea has picked it up   She desires to change pharmacy.   No dyspareunia.   She is taking tylenol for her headache  She has always had problems with headaches but it seem to have become worse.    She has tried fioricet     She is on zoloft 150mg   Will try 100mg      She is noting fatigue     HPI      GYN & OB History  Patient's last menstrual period was 03/10/2022.     Date of Last Pap: No result found    OB History    Para Term  AB Living   1 0 0 0 0 0   SAB IAB Ectopic Multiple Live Births   0 0 0 0 0      # Outcome Date GA Lbr Tyler/2nd Weight Sex Delivery Anes PTL Lv   1                 Review of Systems  Review of Systems   Constitutional: Negative for activity change.   Eyes: Negative for visual disturbance.   Respiratory: Negative for shortness of breath.    Cardiovascular: Negative for chest pain.   Gastrointestinal: Negative for abdominal pain.   Genitourinary: Negative for vaginal bleeding.        No abnormal vaginal bleeding   Musculoskeletal: Negative for back pain.   Integumentary:  Negative for rash and breast mass.   Neurological: Negative for numbness.   Psychiatric/Behavioral:        No mood disturbance or changes    Breast: Negative for mass            Objective:    Physical Exam:   Constitutional: She is oriented to person, place, and time. She appears well-developed. She is cooperative.    HENT:    Head: Normocephalic.     Neck: Trachea normal. No thyromegaly present.    Cardiovascular: Normal rate, regular rhythm and normal heart sounds.     Pulmonary/Chest: Effort normal and breath sounds normal. Right breast exhibits no mass, no nipple discharge and no skin change. Left breast exhibits no mass, no nipple discharge and no skin change.        Abdominal: Soft. There is no abdominal tenderness. There is no rebound and no guarding.     Genitourinary:    Vagina and uterus normal.      Pelvic exam was performed with patient supine.   Labial bartholins normal.There is no lesion on the right labia. There is no lesion on the left labia. Cervix is normal. Right adnexum displays no mass and no tenderness. Left adnexum displays no mass and no tenderness. Cervix exhibits no discharge. Also,  pap results normal   Uterus is uterine prolapse (grade 1). Uterus is not enlarged and not tender.              Lymphadenopathy:        Head (right side): No submental and no submandibular adenopathy present.        Head (left side): No submental and no submandibular adenopathy present.     She has no cervical adenopathy.    Neurological: She is alert and oriented to person, place, and time.    Skin: Skin is warm.    Psychiatric: She has a normal mood and affect. Her speech is normal and behavior is normal. Thought content normal.          Assessment:        1. Well woman exam with routine gynecological exam    2. Dysmenorrhea    3. Anxiety and depression    4. Other fatigue    5. Chronic tension-type headache, not intractable                 Plan:           Start the nuvaring today.    Fasting lab work     Will see if I can reduce zoloft to 100mg     Pap discussed will return for her annual     Pt is aware we call all results. If she does not hear from our office regarding her result within a week of having a study or procedure performed she is to call the office so that we can research the result for her.  Birth control  discussed  Chaperone was present

## 2022-04-19 ENCOUNTER — TELEPHONE (OUTPATIENT)
Dept: OBSTETRICS AND GYNECOLOGY | Facility: CLINIC | Age: 27
End: 2022-04-19
Payer: MEDICAID

## 2022-04-19 ENCOUNTER — PATIENT MESSAGE (OUTPATIENT)
Dept: OBSTETRICS AND GYNECOLOGY | Facility: CLINIC | Age: 27
End: 2022-04-19
Payer: MEDICAID

## 2022-04-19 DIAGNOSIS — Z32.01 POSITIVE URINE PREGNANCY TEST: Primary | ICD-10-CM

## 2022-04-19 NOTE — TELEPHONE ENCOUNTER
Brant Karimi,     Yes andree I am ordering a Quant HCG and Progesterone at [facility].  You may go at your convenience to have drawn.    I will call you with results as soon as they become available to me.  Congratulations!   Let me know when you would like to schedule your OB Intake visit.    Paty   ===View-only below this line===      ----- Message -----       From:Sachi Loaiza       Sent:4/19/2022  3:49 PM CDT         To:Magdy Wagner III, MD    Subject:Lab work    Paty,    I took a pregnancy test today and it came back positive. Can you please send lab work for me to have done?    Thank you,  Sachi Patel

## 2022-04-20 LAB
B-HCG SERPL-ACNC: 744 MIU/ML
PROGEST SERPL-MCNC: 9.39 NG/ML

## 2022-04-22 LAB
ABS NRBC COUNT: 0 X 10 3/UL (ref 0–0.01)
ABSOLUTE BASOPHIL: 0.07 X 10 3/UL (ref 0–0.22)
ABSOLUTE EOSINOPHIL: 0.1 X 10 3/UL (ref 0.04–0.54)
ABSOLUTE IMMATURE GRAN: 0.02 X 10 3/UL (ref 0–0.04)
ABSOLUTE LYMPHOCYTE: 2.78 X 10 3/UL (ref 0.86–4.75)
ABSOLUTE MONOCYTE: 0.58 X 10 3/UL (ref 0.22–1.08)
ALBUMIN SERPL-MCNC: 4.3 G/DL (ref 3.5–5.2)
ALBUMIN/GLOB SERPL ELPH: 1.5 {RATIO} (ref 1–2.7)
ALP ISOS SERPL LEV INH-CCNC: 107 U/L (ref 35–105)
ALT (SGPT): 12 U/L (ref 0–33)
AMORPH URATE CRY URNS QL MICRO: NEGATIVE
ANION GAP SERPL CALC-SCNC: 8 MMOL/L (ref 8–17)
AST SERPL-CCNC: 14 U/L (ref 0–32)
BACTERIA #/AREA URNS HPF: ABNORMAL /[HPF]
BASOPHILS NFR BLD: 0.9 % (ref 0.2–1.2)
BILIRUB UR QL STRIP: NEGATIVE
BILIRUBIN, TOTAL: 0.24 MG/DL (ref 0–1.2)
BUN/CREAT SERPL: 13.6 (ref 6–20)
CALCIUM SERPL-MCNC: 9.2 MG/DL (ref 8.6–10.2)
CARBON DIOXIDE, CO2: 25 MMOL/L (ref 22–29)
CHLORIDE: 105 MMOL/L (ref 98–107)
CHOLEST SERPL-MSCNC: 172 MG/DL (ref 100–200)
CLARITY UR: ABNORMAL
COLOR UR: YELLOW
CREAT SERPL-MCNC: 0.55 MG/DL (ref 0.5–0.9)
EOSINOPHIL NFR BLD: 1.3 % (ref 0.7–7)
EPITHELIAL CELLS: ABNORMAL
GFR ESTIMATION: 132.59
GLOBULIN: 2.8 G/DL (ref 1.5–4.5)
GLUCOSE (UA): NEGATIVE MG/DL
GLUCOSE: 92 MG/DL (ref 74–106)
HCT VFR BLD AUTO: 31.2 % (ref 37–47)
HDLC SERPL-MCNC: 47 MG/DL
HGB BLD-MCNC: 8.5 G/DL (ref 12–16)
IMMATURE GRANULOCYTES: 0.3 % (ref 0–0.5)
KETONES UR QL STRIP: NEGATIVE MG/DL
LDL/HDL RATIO: 2.3 (ref 1–3)
LDLC SERPL CALC-MCNC: 110 MG/DL (ref 0–100)
LEUKOCYTE ESTERASE UR QL STRIP: ABNORMAL
LYMPHOCYTES NFR BLD: 35.4 % (ref 19.3–53.1)
MCH RBC QN AUTO: 18.1 PG (ref 27–32)
MCHC RBC AUTO-ENTMCNC: 27.2 G/DL (ref 32–36)
MCV RBC AUTO: 66.4 FL (ref 82–100)
MONOCYTES NFR BLD: 7.4 % (ref 4.7–12.5)
MUCOUS THREADS URNS QL MICRO: ABNORMAL
NEUTROPHILS # BLD AUTO: 4.31 X 10 3/UL (ref 2.15–7.56)
NEUTROPHILS NFR BLD: 54.7 % (ref 34–71.1)
NITRITE UR QL STRIP: NEGATIVE
NUCLEATED RED BLOOD CELLS: 0 /100 WBC (ref 0–0.2)
OCCULT BLOOD: NEGATIVE
PH, URINE: 7 (ref 5–7.5)
PLATELET # BLD AUTO: 557 X 10 3/UL (ref 135–400)
POTASSIUM: 4.5 MMOL/L (ref 3.5–5.1)
PROT SNV-MCNC: 7.1 G/DL (ref 6.4–8.3)
PROT UR QL STRIP: NEGATIVE MG/DL
RBC # BLD AUTO: 4.7 X 10 6/UL (ref 4.2–5.4)
RBC/HPF: NEGATIVE
RDW-SD: 48.4 FL (ref 37–54)
SODIUM: 138 MMOL/L (ref 136–145)
SP GR UR STRIP: 1.01 (ref 1–1.03)
T4, FREE: 1.35 NG/DL (ref 0.93–1.7)
TRIGL SERPL-MCNC: 75 MG/DL (ref 0–150)
TSH SERPL DL<=0.005 MIU/L-ACNC: 1.46 UIU/ML (ref 0.27–4.2)
UREA NITROGEN (BUN): 7.5 MG/DL (ref 6–20)
URINE CULTURE, ROUTINE: NORMAL
UROBILINOGEN, URINE: NORMAL E.U./DL (ref 0–1)
VITAMIN D (25OHD): 10.5 NG/ML
WBC # BLD: 7.86 X 10 3/UL (ref 4.3–10.8)
WBC/HPF: ABNORMAL

## 2022-04-26 ENCOUNTER — CLINICAL SUPPORT (OUTPATIENT)
Dept: OBSTETRICS AND GYNECOLOGY | Facility: CLINIC | Age: 27
End: 2022-04-26
Payer: MEDICAID

## 2022-04-26 ENCOUNTER — PROCEDURE VISIT (OUTPATIENT)
Dept: OBSTETRICS AND GYNECOLOGY | Facility: CLINIC | Age: 27
End: 2022-04-26
Payer: MEDICAID

## 2022-04-26 VITALS
SYSTOLIC BLOOD PRESSURE: 140 MMHG | BODY MASS INDEX: 39.5 KG/M2 | HEART RATE: 99 BPM | WEIGHT: 223 LBS | DIASTOLIC BLOOD PRESSURE: 72 MMHG

## 2022-04-26 DIAGNOSIS — R79.89 LOW SERUM PROGESTERONE: ICD-10-CM

## 2022-04-26 DIAGNOSIS — Z34.90 PREGNANCY, UNSPECIFIED GESTATIONAL AGE: Primary | ICD-10-CM

## 2022-04-26 DIAGNOSIS — Z34.90 PREGNANCY, UNSPECIFIED GESTATIONAL AGE: ICD-10-CM

## 2022-04-26 PROCEDURE — 76817 TRANSVAGINAL US OBSTETRIC: CPT | Mod: S$GLB,,, | Performed by: OBSTETRICS & GYNECOLOGY

## 2022-04-26 PROCEDURE — 76801 US OB/GYN PROCEDURE (VIEWPOINT): ICD-10-PCS | Mod: S$GLB,,, | Performed by: OBSTETRICS & GYNECOLOGY

## 2022-04-26 PROCEDURE — 76801 OB US < 14 WKS SINGLE FETUS: CPT | Mod: S$GLB,,, | Performed by: OBSTETRICS & GYNECOLOGY

## 2022-04-26 PROCEDURE — 76817 US OB/GYN PROCEDURE (VIEWPOINT): ICD-10-PCS | Mod: S$GLB,,, | Performed by: OBSTETRICS & GYNECOLOGY

## 2022-04-26 RX ORDER — PROGESTERONE 200 MG/1
200 CAPSULE ORAL EVERY 12 HOURS
Qty: 60 CAPSULE | Refills: 6 | Status: SHIPPED | OUTPATIENT
Start: 2022-04-26 | End: 2023-01-18

## 2022-04-26 NOTE — PROGRESS NOTES
OB Intake, no concerns today.    She is not Covid Vaccinated.  She had Covid three times.  Genetic testing offered and declined

## 2022-04-30 NOTE — OP NOTE
Patient:   Sachi Mota            MRN: [id number]            FIN: [id number]               Age:   23 years     Sex:  Female     :  1995   Associated Diagnoses:   HEARTBURN   Author:   Enrike Saleh MD      Operative Note   Operative Information   Date/ Time:  2018 09:05:00.     Procedures Performed: Procedure Code   No active encounter procedure items have been selected or recorded., Esophagogastroduodenoscopy.     Preoperative Diagnosis: HEARTBURN ([id number]).     Postoperative Diagnosis: HEARTBURN ([id number]).     Surgeon: Enrike Saleh MD.     Anesthesia: MAC.     Description of Procedure/Findings/    Complications: Patient was taken to the OR.  The patient's throat was aneshtetized.  MAC was utilized for anesthesia.   was easily passed.  Findings were as follows:  -Oropharynx: normal  -Airway: normal  -Esophagus: normal  -Stomach: normal    -Duodenum: normal.     Esimated blood loss: No blood loss.     Complications: None.     Notes: occ hb.

## 2022-05-02 LAB
ABS NRBC COUNT: 0 X 10 3/UL (ref 0–0.01)
ABSOLUTE BASOPHIL: 0.06 X 10 3/UL (ref 0–0.22)
ABSOLUTE EOSINOPHIL: 0.07 X 10 3/UL (ref 0.04–0.54)
ABSOLUTE IMMATURE GRAN: 0.02 X 10 3/UL (ref 0–0.04)
ABSOLUTE LYMPHOCYTE: 3.08 X 10 3/UL (ref 0.86–4.75)
ABSOLUTE MONOCYTE: 0.63 X 10 3/UL (ref 0.22–1.08)
ANTIBODY SCREEN: NEGATIVE
B-HCG SERPL-ACNC: NORMAL MIU/ML
BASOPHILS NFR BLD: 0.6 % (ref 0.2–1.2)
BLOOD GROUPING: NORMAL
BLOOD TYPE (D): POSITIVE
EOSINOPHIL NFR BLD: 0.8 % (ref 0.7–7)
HBV SURFACE AG SERPL QL IA: NONREACTIVE
HCT VFR BLD AUTO: 31.3 % (ref 37–47)
HCV IGG SERPL QL IA: NONREACTIVE
HGB BLD-MCNC: 8.7 G/DL (ref 12–16)
HIV 1+2 AB+HIV1 P24 AG SERPL QL IA: NONREACTIVE
HSV1 IGG SER-ACNC: NONREACTIVE
HSV2 IGG SER-ACNC: NONREACTIVE
IMMATURE GRANULOCYTES: 0.2 % (ref 0–0.5)
LYMPHOCYTES NFR BLD: 33.2 % (ref 19.3–53.1)
MCH RBC QN AUTO: 18.6 PG (ref 27–32)
MCHC RBC AUTO-ENTMCNC: 27.8 G/DL (ref 32–36)
MCV RBC AUTO: 66.7 FL (ref 82–100)
MONOCYTES NFR BLD: 6.8 % (ref 4.7–12.5)
NEUTROPHILS # BLD AUTO: 5.41 X 10 3/UL (ref 2.15–7.56)
NEUTROPHILS NFR BLD: 58.4 % (ref 34–71.1)
NUCLEATED RED BLOOD CELLS: 0 /100 WBC (ref 0–0.2)
PLATELET # BLD AUTO: 393 X 10 3/UL (ref 135–400)
RBC # BLD AUTO: 4.69 X 10 6/UL (ref 4.2–5.4)
RDW-SD: 46.1 FL (ref 37–54)
RUBELLA IGG SCREEN: NORMAL
SYPHILIS TREPONEMAL ANTIBODY: NONREACTIVE
T4, FREE: 1.21 NG/DL (ref 0.93–1.7)
TSH SERPL DL<=0.005 MIU/L-ACNC: 1.78 UIU/ML (ref 0.27–4.2)
WBC # BLD: 9.27 X 10 3/UL (ref 4.3–10.8)

## 2022-05-03 ENCOUNTER — PATIENT MESSAGE (OUTPATIENT)
Dept: OBSTETRICS AND GYNECOLOGY | Facility: CLINIC | Age: 27
End: 2022-05-03
Payer: MEDICAID

## 2022-05-05 ENCOUNTER — PROCEDURE VISIT (OUTPATIENT)
Dept: OBSTETRICS AND GYNECOLOGY | Facility: CLINIC | Age: 27
End: 2022-05-05
Payer: MEDICAID

## 2022-05-05 ENCOUNTER — TELEPHONE (OUTPATIENT)
Dept: OBSTETRICS AND GYNECOLOGY | Facility: CLINIC | Age: 27
End: 2022-05-05

## 2022-05-05 ENCOUNTER — OFFICE VISIT (OUTPATIENT)
Dept: OBSTETRICS AND GYNECOLOGY | Facility: CLINIC | Age: 27
End: 2022-05-05
Payer: MEDICAID

## 2022-05-05 VITALS
HEIGHT: 63 IN | DIASTOLIC BLOOD PRESSURE: 77 MMHG | BODY MASS INDEX: 39.09 KG/M2 | SYSTOLIC BLOOD PRESSURE: 112 MMHG | HEART RATE: 86 BPM | WEIGHT: 220.63 LBS

## 2022-05-05 DIAGNOSIS — Z34.90 PREGNANCY, UNSPECIFIED GESTATIONAL AGE: Primary | ICD-10-CM

## 2022-05-05 DIAGNOSIS — F32.A ANXIETY AND DEPRESSION: ICD-10-CM

## 2022-05-05 DIAGNOSIS — F41.9 ANXIETY AND DEPRESSION: ICD-10-CM

## 2022-05-05 DIAGNOSIS — Z34.90 PREGNANCY, UNSPECIFIED GESTATIONAL AGE: ICD-10-CM

## 2022-05-05 PROCEDURE — 3008F PR BODY MASS INDEX (BMI) DOCUMENTED: ICD-10-PCS | Mod: CPTII,S$GLB,, | Performed by: OBSTETRICS & GYNECOLOGY

## 2022-05-05 PROCEDURE — 76817 US OB/GYN PROCEDURE (VIEWPOINT): ICD-10-PCS | Mod: S$GLB,,, | Performed by: OBSTETRICS & GYNECOLOGY

## 2022-05-05 PROCEDURE — 3008F BODY MASS INDEX DOCD: CPT | Mod: CPTII,S$GLB,, | Performed by: OBSTETRICS & GYNECOLOGY

## 2022-05-05 PROCEDURE — 99203 PR OFFICE/OUTPT VISIT, NEW, LEVL III, 30-44 MIN: ICD-10-PCS | Mod: 25,TH,S$GLB, | Performed by: OBSTETRICS & GYNECOLOGY

## 2022-05-05 PROCEDURE — 76801 US OB/GYN PROCEDURE (VIEWPOINT): ICD-10-PCS | Mod: S$GLB,,, | Performed by: OBSTETRICS & GYNECOLOGY

## 2022-05-05 PROCEDURE — 3078F DIAST BP <80 MM HG: CPT | Mod: CPTII,S$GLB,, | Performed by: OBSTETRICS & GYNECOLOGY

## 2022-05-05 PROCEDURE — 76801 OB US < 14 WKS SINGLE FETUS: CPT | Mod: S$GLB,,, | Performed by: OBSTETRICS & GYNECOLOGY

## 2022-05-05 PROCEDURE — 3078F PR MOST RECENT DIASTOLIC BLOOD PRESSURE < 80 MM HG: ICD-10-PCS | Mod: CPTII,S$GLB,, | Performed by: OBSTETRICS & GYNECOLOGY

## 2022-05-05 PROCEDURE — 3074F PR MOST RECENT SYSTOLIC BLOOD PRESSURE < 130 MM HG: ICD-10-PCS | Mod: CPTII,S$GLB,, | Performed by: OBSTETRICS & GYNECOLOGY

## 2022-05-05 PROCEDURE — 76817 TRANSVAGINAL US OBSTETRIC: CPT | Mod: S$GLB,,, | Performed by: OBSTETRICS & GYNECOLOGY

## 2022-05-05 PROCEDURE — 99203 OFFICE O/P NEW LOW 30 MIN: CPT | Mod: 25,TH,S$GLB, | Performed by: OBSTETRICS & GYNECOLOGY

## 2022-05-05 PROCEDURE — 3074F SYST BP LT 130 MM HG: CPT | Mod: CPTII,S$GLB,, | Performed by: OBSTETRICS & GYNECOLOGY

## 2022-05-05 RX ORDER — FLUOXETINE HYDROCHLORIDE 40 MG/1
40 CAPSULE ORAL DAILY
Qty: 90 CAPSULE | Refills: 2 | Status: SHIPPED | OUTPATIENT
Start: 2022-05-05 | End: 2022-07-05

## 2022-05-05 RX ORDER — ONDANSETRON 4 MG/1
8 TABLET, ORALLY DISINTEGRATING ORAL EVERY 8 HOURS PRN
Qty: 18 TABLET | Refills: 3 | Status: SHIPPED | OUTPATIENT
Start: 2022-05-05 | End: 2022-12-07

## 2022-05-05 RX ORDER — BUPROPION HYDROCHLORIDE 150 MG/1
150 TABLET ORAL DAILY
Qty: 90 TABLET | Refills: 3 | Status: SHIPPED | OUTPATIENT
Start: 2022-05-05 | End: 2022-05-05 | Stop reason: SDUPTHER

## 2022-05-05 RX ORDER — PROMETHAZINE HYDROCHLORIDE 12.5 MG/1
12.5 TABLET ORAL 4 TIMES DAILY
Qty: 30 TABLET | Refills: 1 | Status: SHIPPED | OUTPATIENT
Start: 2022-05-05 | End: 2022-07-14

## 2022-05-05 NOTE — PROGRESS NOTES
Subjective:       Patient ID: Sachi Loaiza is a 27 y.o. female.    Chief Complaint:  Routine Prenatal Visit      History of Present Illness  She is doing well.  No new health issues,  No abnormal bleeding, No GI or Gu concerns,  No dyspareunia  She is doing well she is on her antidepressant   She is feeling a little regression.   No SI no HI      .   HPI      GYN & OB History  Patient's last menstrual period was 03/10/2022 (exact date).     Date of Last Pap: No result found    OB History    Para Term  AB Living   2 1 0 1 0 1   SAB IAB Ectopic Multiple Live Births   0 0 0 1 2      # Outcome Date GA Lbr Tyler/2nd Weight Sex Delivery Anes PTL Lv   2 Current            1A  21 34w0d  2.013 kg (4 lb 7 oz) F CS-Unspec EPI N GRETTA      Complications: Dysfunctional Labor, Preeclampsia   1B  21 34w0d  1.701 kg (3 lb 12 oz) F CS-Unspec EPI N       Complications: Preeclampsia       Review of Systems  Review of Systems          Objective:    Physical Exam       Assessment:        1. Pregnancy, unspecified gestational age                 Plan:             Pap discussed will return for her annual     Pt is aware we call all results. If she does not hear from our office regarding her result within a week of having a study or procedure performed she is to call the office so that we can research the result for her.  Birth control discussed  Chaperone was present

## 2022-05-06 RX ORDER — BUPROPION HYDROCHLORIDE 150 MG/1
150 TABLET ORAL DAILY
Qty: 90 TABLET | Refills: 3 | Status: SHIPPED | OUTPATIENT
Start: 2022-05-06 | End: 2022-11-21 | Stop reason: SDUPTHER

## 2022-05-09 LAB
CHLAMYDIA: NEGATIVE
GONORRHEA: NEGATIVE
SOURCE: NORMAL

## 2022-05-25 ENCOUNTER — TELEPHONE (OUTPATIENT)
Dept: OBSTETRICS AND GYNECOLOGY | Facility: CLINIC | Age: 27
End: 2022-05-25
Payer: MEDICAID

## 2022-05-25 NOTE — TELEPHONE ENCOUNTER
----- Message from Jairo Sandoval sent at 5/25/2022 11:32 AM CDT -----  Contact: Marcelina heaton [facility]  Marcelina   from [facility] called regarding patient due date. Please call  [phone]

## 2022-06-07 ENCOUNTER — PROCEDURE VISIT (OUTPATIENT)
Dept: OBSTETRICS AND GYNECOLOGY | Facility: CLINIC | Age: 27
End: 2022-06-07
Payer: MEDICAID

## 2022-06-07 ENCOUNTER — ROUTINE PRENATAL (OUTPATIENT)
Dept: OBSTETRICS AND GYNECOLOGY | Facility: CLINIC | Age: 27
End: 2022-06-07
Payer: MEDICAID

## 2022-06-07 VITALS
WEIGHT: 217.38 LBS | SYSTOLIC BLOOD PRESSURE: 120 MMHG | DIASTOLIC BLOOD PRESSURE: 79 MMHG | BODY MASS INDEX: 38.51 KG/M2 | HEART RATE: 105 BPM

## 2022-06-07 DIAGNOSIS — Z34.90 PREGNANCY, UNSPECIFIED GESTATIONAL AGE: ICD-10-CM

## 2022-06-07 DIAGNOSIS — Z34.90 PREGNANCY, UNSPECIFIED GESTATIONAL AGE: Primary | ICD-10-CM

## 2022-06-07 PROCEDURE — 99212 OFFICE O/P EST SF 10 MIN: CPT | Mod: 25,TH,S$GLB, | Performed by: OBSTETRICS & GYNECOLOGY

## 2022-06-07 PROCEDURE — 76815 OB US LIMITED FETUS(S): CPT | Mod: S$GLB,,, | Performed by: OBSTETRICS & GYNECOLOGY

## 2022-06-07 PROCEDURE — 76815 US OB/GYN PROCEDURE (VIEWPOINT): ICD-10-PCS | Mod: S$GLB,,, | Performed by: OBSTETRICS & GYNECOLOGY

## 2022-06-07 PROCEDURE — 99212 PR OFFICE/OUTPT VISIT, EST, LEVL II, 10-19 MIN: ICD-10-PCS | Mod: 25,TH,S$GLB, | Performed by: OBSTETRICS & GYNECOLOGY

## 2022-06-07 NOTE — PROGRESS NOTES
27 y.o. female  at 10w6d   Denies VB,  Nausea discussed   Doing well   TWG:-5   Reviewed prenatal labs  Genetic testing offered and discussed   Dating ultrasound discussed   Reviewed warning signs, pregnancy precautions and how/when to call.  RTC x 4 wks, call or present sooner prn.   Covid vaccine recommended    she is managing the nausea and vomiting    She is doing well and taking her antidepressant         
EXAM DESCRIPTION:  RAD - Hand Left 3 View - 5/21/2022 4:12 pm

 

CLINICAL HISTORY:  PAIN, laceration, possible foreign body

 

COMPARISON:  None.

 

FINDINGS:  No fracture, dislocation or periosteal reaction noted. No bone or joint abnormality. Walker
ging is in place. No soft tissue foreign body.

 

IMPRESSION:  No foreign body. No acute bone finding.
ER physician

## 2022-06-15 ENCOUNTER — TELEPHONE (OUTPATIENT)
Dept: OBSTETRICS AND GYNECOLOGY | Facility: CLINIC | Age: 27
End: 2022-06-15
Payer: MEDICAID

## 2022-06-17 ENCOUNTER — PATIENT MESSAGE (OUTPATIENT)
Dept: OBSTETRICS AND GYNECOLOGY | Facility: CLINIC | Age: 27
End: 2022-06-17
Payer: MEDICAID

## 2022-07-05 ENCOUNTER — ROUTINE PRENATAL (OUTPATIENT)
Dept: OBSTETRICS AND GYNECOLOGY | Facility: CLINIC | Age: 27
End: 2022-07-05
Payer: MEDICAID

## 2022-07-05 VITALS
SYSTOLIC BLOOD PRESSURE: 121 MMHG | HEART RATE: 81 BPM | DIASTOLIC BLOOD PRESSURE: 83 MMHG | WEIGHT: 223 LBS | BODY MASS INDEX: 39.5 KG/M2

## 2022-07-05 DIAGNOSIS — Z34.90 PREGNANCY, UNSPECIFIED GESTATIONAL AGE: Primary | ICD-10-CM

## 2022-07-05 PROCEDURE — 99212 OFFICE O/P EST SF 10 MIN: CPT | Mod: TH,S$GLB,, | Performed by: OBSTETRICS & GYNECOLOGY

## 2022-07-05 PROCEDURE — 99212 PR OFFICE/OUTPT VISIT, EST, LEVL II, 10-19 MIN: ICD-10-PCS | Mod: TH,S$GLB,, | Performed by: OBSTETRICS & GYNECOLOGY

## 2022-07-05 NOTE — PROGRESS NOTES
27 y.o. female  at 14w6d   Denies VB,  Nausea discussed   Doing well   TWG:  Reviewed prenatal labs  Genetic testing offered and discussed   Dating ultrasound discussed   Reviewed warning signs, pregnancy precautions and how/when to call.  RTC x 4 wks, call or present sooner prn.   Covid vaccine recommended

## 2022-07-14 ENCOUNTER — PATIENT MESSAGE (OUTPATIENT)
Dept: OBSTETRICS AND GYNECOLOGY | Facility: CLINIC | Age: 27
End: 2022-07-14

## 2022-07-14 ENCOUNTER — PROCEDURE VISIT (OUTPATIENT)
Dept: OBSTETRICS AND GYNECOLOGY | Facility: CLINIC | Age: 27
End: 2022-07-14
Payer: MEDICAID

## 2022-07-14 ENCOUNTER — ROUTINE PRENATAL (OUTPATIENT)
Dept: OBSTETRICS AND GYNECOLOGY | Facility: CLINIC | Age: 27
End: 2022-07-14
Payer: MEDICAID

## 2022-07-14 VITALS
DIASTOLIC BLOOD PRESSURE: 74 MMHG | HEART RATE: 76 BPM | BODY MASS INDEX: 38.44 KG/M2 | SYSTOLIC BLOOD PRESSURE: 109 MMHG | WEIGHT: 217 LBS

## 2022-07-14 DIAGNOSIS — B37.31 VAGINAL YEAST INFECTION: Primary | ICD-10-CM

## 2022-07-14 DIAGNOSIS — Z34.90 PREGNANCY, UNSPECIFIED GESTATIONAL AGE: Primary | ICD-10-CM

## 2022-07-14 DIAGNOSIS — O46.92 VAGINAL BLEEDING IN PREGNANCY, SECOND TRIMESTER: Primary | ICD-10-CM

## 2022-07-14 DIAGNOSIS — O46.92 VAGINAL BLEEDING IN PREGNANCY, SECOND TRIMESTER: ICD-10-CM

## 2022-07-14 PROCEDURE — 99212 PR OFFICE/OUTPT VISIT, EST, LEVL II, 10-19 MIN: ICD-10-PCS | Mod: 25,TH,S$GLB, | Performed by: OBSTETRICS & GYNECOLOGY

## 2022-07-14 PROCEDURE — 76815 OB US LIMITED FETUS(S): CPT | Mod: S$GLB,,, | Performed by: OBSTETRICS & GYNECOLOGY

## 2022-07-14 PROCEDURE — 76815 US OB/GYN PROCEDURE (VIEWPOINT): ICD-10-PCS | Mod: S$GLB,,, | Performed by: OBSTETRICS & GYNECOLOGY

## 2022-07-14 PROCEDURE — 99212 OFFICE O/P EST SF 10 MIN: CPT | Mod: 25,TH,S$GLB, | Performed by: OBSTETRICS & GYNECOLOGY

## 2022-07-14 RX ORDER — NAPROXEN SODIUM 220 MG/1
81 TABLET, FILM COATED ORAL DAILY
Refills: 0
Start: 2022-07-14 | End: 2023-01-18

## 2022-07-14 NOTE — PROGRESS NOTES
She had some spotting but has resolved now   The u/s is good   No concerns   She has had some pain in her vagina   On exam  She has a yeast   She finished Terazol 7 over a week a week ago will use gynazol 1

## 2022-07-14 NOTE — TELEPHONE ENCOUNTER
Patient states [facility] is out of stock and would like new prescription sent to [facility] on [address]. Allergies reviewed. Pharmacy up to date. Medication pending. Please approve.

## 2022-07-22 ENCOUNTER — PATIENT MESSAGE (OUTPATIENT)
Dept: OBSTETRICS AND GYNECOLOGY | Facility: CLINIC | Age: 27
End: 2022-07-22
Payer: MEDICAID

## 2022-08-02 ENCOUNTER — PATIENT MESSAGE (OUTPATIENT)
Dept: OBSTETRICS AND GYNECOLOGY | Facility: CLINIC | Age: 27
End: 2022-08-02
Payer: MEDICAID

## 2022-08-08 ENCOUNTER — PROCEDURE VISIT (OUTPATIENT)
Dept: OBSTETRICS AND GYNECOLOGY | Facility: CLINIC | Age: 27
End: 2022-08-08
Payer: MEDICAID

## 2022-08-08 ENCOUNTER — ROUTINE PRENATAL (OUTPATIENT)
Dept: OBSTETRICS AND GYNECOLOGY | Facility: CLINIC | Age: 27
End: 2022-08-08
Payer: MEDICAID

## 2022-08-08 ENCOUNTER — TELEPHONE (OUTPATIENT)
Dept: OBSTETRICS AND GYNECOLOGY | Facility: CLINIC | Age: 27
End: 2022-08-08

## 2022-08-08 VITALS
SYSTOLIC BLOOD PRESSURE: 115 MMHG | BODY MASS INDEX: 39.68 KG/M2 | WEIGHT: 224 LBS | DIASTOLIC BLOOD PRESSURE: 77 MMHG | HEART RATE: 89 BPM

## 2022-08-08 DIAGNOSIS — Z34.90 PREGNANCY, UNSPECIFIED GESTATIONAL AGE: ICD-10-CM

## 2022-08-08 DIAGNOSIS — Z34.90 PREGNANCY, UNSPECIFIED GESTATIONAL AGE: Primary | ICD-10-CM

## 2022-08-08 DIAGNOSIS — R10.2 PELVIC PAIN: Primary | ICD-10-CM

## 2022-08-08 PROCEDURE — 76805 US OB/GYN PROCEDURE (VIEWPOINT): ICD-10-PCS | Mod: S$GLB,,, | Performed by: OBSTETRICS & GYNECOLOGY

## 2022-08-08 PROCEDURE — 99212 PR OFFICE/OUTPT VISIT, EST, LEVL II, 10-19 MIN: ICD-10-PCS | Mod: 25,TH,S$GLB, | Performed by: OBSTETRICS & GYNECOLOGY

## 2022-08-08 PROCEDURE — 76805 OB US >/= 14 WKS SNGL FETUS: CPT | Mod: S$GLB,,, | Performed by: OBSTETRICS & GYNECOLOGY

## 2022-08-08 PROCEDURE — 99212 OFFICE O/P EST SF 10 MIN: CPT | Mod: 25,TH,S$GLB, | Performed by: OBSTETRICS & GYNECOLOGY

## 2022-08-08 RX ORDER — HYDROCODONE BITARTRATE AND ACETAMINOPHEN 5; 325 MG/1; MG/1
1 TABLET ORAL EVERY 6 HOURS PRN
Qty: 18 TABLET | Refills: 0 | Status: SHIPPED | OUTPATIENT
Start: 2022-08-08 | End: 2022-12-07

## 2022-08-08 RX ORDER — CEPHALEXIN 500 MG/1
CAPSULE ORAL
COMMUNITY
Start: 2022-08-02 | End: 2022-10-20

## 2022-08-08 NOTE — PROGRESS NOTES
27 y.o. female  at 19w5d   Starting to feel flutters/FM, denies VB, leaking of fluid, cramping  Doing well   TW  anatomy US  Reviewed warning signs, normal FM,  labor precautions and how/when to call.  RTC x 4 wks, call or present sooner prn.   Covid vaccine recommended   She went to the ER for pelvic pain and treated for a UTI.   She went back into the ER and had conservative treatment.  She finds the pain is horrible    Consider a prenatal cradle

## 2022-08-15 DIAGNOSIS — B37.31 VAGINAL YEAST INFECTION: Primary | ICD-10-CM

## 2022-08-15 DIAGNOSIS — B37.31 VAGINAL YEAST INFECTION: ICD-10-CM

## 2022-08-15 NOTE — TELEPHONE ENCOUNTER
Pt states she is having yeast infection symptoms and is requesting the same meds as last time because it worked.

## 2022-08-31 ENCOUNTER — PATIENT MESSAGE (OUTPATIENT)
Dept: OBSTETRICS AND GYNECOLOGY | Facility: CLINIC | Age: 27
End: 2022-08-31
Payer: MEDICAID

## 2022-09-06 ENCOUNTER — ROUTINE PRENATAL (OUTPATIENT)
Dept: OBSTETRICS AND GYNECOLOGY | Facility: CLINIC | Age: 27
End: 2022-09-06
Payer: MEDICAID

## 2022-09-06 ENCOUNTER — PROCEDURE VISIT (OUTPATIENT)
Dept: OBSTETRICS AND GYNECOLOGY | Facility: CLINIC | Age: 27
End: 2022-09-06
Payer: MEDICAID

## 2022-09-06 VITALS
BODY MASS INDEX: 41.1 KG/M2 | DIASTOLIC BLOOD PRESSURE: 72 MMHG | SYSTOLIC BLOOD PRESSURE: 109 MMHG | HEART RATE: 90 BPM | WEIGHT: 232 LBS

## 2022-09-06 DIAGNOSIS — Z34.90 PREGNANCY, UNSPECIFIED GESTATIONAL AGE: ICD-10-CM

## 2022-09-06 DIAGNOSIS — Z34.90 PREGNANCY, UNSPECIFIED GESTATIONAL AGE: Primary | ICD-10-CM

## 2022-09-06 PROCEDURE — 76816 OB US FOLLOW-UP PER FETUS: CPT | Mod: S$GLB,,, | Performed by: OBSTETRICS & GYNECOLOGY

## 2022-09-06 PROCEDURE — 99212 PR OFFICE/OUTPT VISIT, EST, LEVL II, 10-19 MIN: ICD-10-PCS | Mod: TH,S$GLB,, | Performed by: OBSTETRICS & GYNECOLOGY

## 2022-09-06 PROCEDURE — 99212 OFFICE O/P EST SF 10 MIN: CPT | Mod: TH,S$GLB,, | Performed by: OBSTETRICS & GYNECOLOGY

## 2022-09-06 PROCEDURE — 76816 US OB/GYN PROCEDURE (VIEWPOINT): ICD-10-PCS | Mod: S$GLB,,, | Performed by: OBSTETRICS & GYNECOLOGY

## 2022-09-06 NOTE — PROGRESS NOTES
27 y.o. female  at 23w6d   Reports + FM, denies VB, leaking of fluid, or cramping  Doing well   TW    Reviewed upcoming 28wk labs, (O POS) and orders placed, tdap handout provided and explained  Reviewed warning signs, normal FM,  labor precautions and how/when to call.  RTC x 4 wks, call or present sooner prn.  She is doing better   With the medication changes

## 2022-09-21 LAB
ABS NRBC COUNT: 0 X 10 3/UL (ref 0–0.01)
ABSOLUTE BASOPHIL: 0.04 X 10 3/UL (ref 0–0.22)
ABSOLUTE EOSINOPHIL: 0.11 X 10 3/UL (ref 0.04–0.54)
ABSOLUTE IMMATURE GRAN: 0.12 X 10 3/UL (ref 0–0.04)
ABSOLUTE LYMPHOCYTE: 2.12 X 10 3/UL (ref 0.86–4.75)
ABSOLUTE MONOCYTE: 0.75 X 10 3/UL (ref 0.22–1.08)
BASOPHILS NFR BLD: 0.3 % (ref 0.2–1.2)
EOSINOPHIL NFR BLD: 0.9 % (ref 0.7–7)
GLUCOSE 1 HR POST 50 GM: 143 MG/DL
HCT VFR BLD AUTO: 25.6 % (ref 37–47)
HGB BLD-MCNC: 7.5 G/DL (ref 12–16)
IMMATURE GRANULOCYTES: 0.9 % (ref 0–0.5)
LYMPHOCYTES NFR BLD: 16.7 % (ref 19.3–53.1)
MCH RBC QN AUTO: 21.8 PG (ref 27–32)
MCHC RBC AUTO-ENTMCNC: 29.3 G/DL (ref 32–36)
MCV RBC AUTO: 74.4 FL (ref 82–100)
MONOCYTES NFR BLD: 5.9 % (ref 4.7–12.5)
NEUTROPHILS # BLD AUTO: 9.52 X 10 3/UL (ref 2.15–7.56)
NEUTROPHILS NFR BLD: 75.3 % (ref 34–71.1)
NUCLEATED RED BLOOD CELLS: 0 /100 WBC (ref 0–0.2)
PLATELET # BLD AUTO: 259 X 10 3/UL (ref 135–400)
RBC # BLD AUTO: 3.44 X 10 6/UL (ref 4.2–5.4)
RDW-SD: 46.5 FL (ref 37–54)
WBC # BLD: 12.66 X 10 3/UL (ref 4.3–10.8)

## 2022-09-22 ENCOUNTER — PATIENT MESSAGE (OUTPATIENT)
Dept: OBSTETRICS AND GYNECOLOGY | Facility: CLINIC | Age: 27
End: 2022-09-22
Payer: MEDICAID

## 2022-09-22 DIAGNOSIS — R73.09 ELEVATED GLUCOSE TOLERANCE TEST: Primary | ICD-10-CM

## 2022-09-22 LAB
GLUCOSE, 1 HOUR: 162 MG/DL
GLUCOSE, 2 HOUR: 141 MG/DL
GLUCOSE, 3 HOUR: 65 MG/DL
GLUCOSE, FASTING: 88 MG/DL (ref 74–109)

## 2022-10-04 ENCOUNTER — CLINICAL SUPPORT (OUTPATIENT)
Dept: OBSTETRICS AND GYNECOLOGY | Facility: CLINIC | Age: 27
End: 2022-10-04
Payer: MEDICAID

## 2022-10-04 ENCOUNTER — PROCEDURE VISIT (OUTPATIENT)
Dept: OBSTETRICS AND GYNECOLOGY | Facility: CLINIC | Age: 27
End: 2022-10-04
Payer: MEDICAID

## 2022-10-04 VITALS
BODY MASS INDEX: 42.51 KG/M2 | HEART RATE: 83 BPM | WEIGHT: 240 LBS | DIASTOLIC BLOOD PRESSURE: 68 MMHG | SYSTOLIC BLOOD PRESSURE: 100 MMHG

## 2022-10-04 DIAGNOSIS — Z34.90 PREGNANCY, UNSPECIFIED GESTATIONAL AGE: Primary | ICD-10-CM

## 2022-10-04 DIAGNOSIS — Z3A.28 28 WEEKS GESTATION OF PREGNANCY: Primary | ICD-10-CM

## 2022-10-04 DIAGNOSIS — Z34.90 PREGNANCY, UNSPECIFIED GESTATIONAL AGE: ICD-10-CM

## 2022-10-04 PROCEDURE — 76816 OB US FOLLOW-UP PER FETUS: CPT | Mod: S$GLB,,, | Performed by: OBSTETRICS & GYNECOLOGY

## 2022-10-04 PROCEDURE — 76816 US OB/GYN PROCEDURE (VIEWPOINT): ICD-10-PCS | Mod: S$GLB,,, | Performed by: OBSTETRICS & GYNECOLOGY

## 2022-10-04 NOTE — PROGRESS NOTES
Routine OB visit.  Ultrasound today.  She reports sciatica pain.  Referred to Dr Christy Solomon

## 2022-10-07 ENCOUNTER — PATIENT MESSAGE (OUTPATIENT)
Dept: OBSTETRICS AND GYNECOLOGY | Facility: CLINIC | Age: 27
End: 2022-10-07
Payer: MEDICAID

## 2022-10-07 DIAGNOSIS — B37.9 YEAST INFECTION: Primary | ICD-10-CM

## 2022-10-07 RX ORDER — TERCONAZOLE 4 MG/G
1 CREAM VAGINAL NIGHTLY
Qty: 1 EACH | Refills: 0 | Status: SHIPPED | OUTPATIENT
Start: 2022-10-07 | End: 2022-10-14

## 2022-10-20 ENCOUNTER — ROUTINE PRENATAL (OUTPATIENT)
Dept: OBSTETRICS AND GYNECOLOGY | Facility: CLINIC | Age: 27
End: 2022-10-20
Payer: MEDICAID

## 2022-10-20 VITALS
DIASTOLIC BLOOD PRESSURE: 80 MMHG | SYSTOLIC BLOOD PRESSURE: 122 MMHG | WEIGHT: 240 LBS | BODY MASS INDEX: 42.51 KG/M2 | HEART RATE: 103 BPM

## 2022-10-20 DIAGNOSIS — Z34.90 PREGNANCY, UNSPECIFIED GESTATIONAL AGE: Primary | ICD-10-CM

## 2022-10-20 PROCEDURE — 99212 OFFICE O/P EST SF 10 MIN: CPT | Mod: TH,S$GLB,, | Performed by: OBSTETRICS & GYNECOLOGY

## 2022-10-20 PROCEDURE — 99212 PR OFFICE/OUTPT VISIT, EST, LEVL II, 10-19 MIN: ICD-10-PCS | Mod: TH,S$GLB,, | Performed by: OBSTETRICS & GYNECOLOGY

## 2022-10-20 NOTE — PROGRESS NOTES
27 y.o. female  at 30w1d   Reports normal fetal mvts, denies vaginal bleeding, Leaking fluid  or regular contractions  Doing well  TW   28wk labs discussed (O POS)  Tdap  Consents for  and Csec and blood transfusion risk discussed by me.  R&B discussed in detail and consents are signed   Reviewed warning signs, normal fetal movements,  labor precautions in detail   RTC x 2 wks, call or present sooner prn.

## 2022-10-21 ENCOUNTER — PATIENT MESSAGE (OUTPATIENT)
Dept: OBSTETRICS AND GYNECOLOGY | Facility: CLINIC | Age: 27
End: 2022-10-21
Payer: MEDICAID

## 2022-10-21 DIAGNOSIS — B37.31 VAGINAL YEAST INFECTION: Primary | ICD-10-CM

## 2022-10-21 RX ORDER — TERCONAZOLE 4 MG/G
1 CREAM VAGINAL NIGHTLY
Qty: 7 G | Refills: 0 | Status: SHIPPED | OUTPATIENT
Start: 2022-10-21 | End: 2022-12-07

## 2022-10-21 NOTE — TELEPHONE ENCOUNTER
Patient c/o current vaginal yeast infection. Allergies reviewed. Pharmacy up to date. Medication pending. Please approve.

## 2022-11-03 ENCOUNTER — CLINICAL SUPPORT (OUTPATIENT)
Dept: OBSTETRICS AND GYNECOLOGY | Facility: CLINIC | Age: 27
End: 2022-11-03
Payer: MEDICAID

## 2022-11-03 VITALS
HEART RATE: 94 BPM | BODY MASS INDEX: 42.69 KG/M2 | SYSTOLIC BLOOD PRESSURE: 127 MMHG | DIASTOLIC BLOOD PRESSURE: 80 MMHG | WEIGHT: 241 LBS

## 2022-11-03 DIAGNOSIS — O36.8130 DECREASED FETAL MOVEMENTS IN THIRD TRIMESTER, SINGLE OR UNSPECIFIED FETUS: Primary | ICD-10-CM

## 2022-11-03 DIAGNOSIS — Z3A.32 32 WEEKS GESTATION OF PREGNANCY: ICD-10-CM

## 2022-11-03 NOTE — PROGRESS NOTES
Patient reports decreased fetal movement. FHT difficult to hear with doppler.  Patient is sent to Labor and Delivery for evaluation  Patient denies cramping or vaginal bleeding

## 2022-11-21 ENCOUNTER — ROUTINE PRENATAL (OUTPATIENT)
Dept: OBSTETRICS AND GYNECOLOGY | Facility: CLINIC | Age: 27
End: 2022-11-21
Payer: MEDICAID

## 2022-11-21 VITALS
SYSTOLIC BLOOD PRESSURE: 129 MMHG | BODY MASS INDEX: 43.05 KG/M2 | WEIGHT: 243 LBS | DIASTOLIC BLOOD PRESSURE: 78 MMHG | HEART RATE: 83 BPM

## 2022-11-21 DIAGNOSIS — Z34.90 PREGNANCY, UNSPECIFIED GESTATIONAL AGE: Primary | ICD-10-CM

## 2022-11-21 DIAGNOSIS — F41.9 ANXIETY AND DEPRESSION: ICD-10-CM

## 2022-11-21 DIAGNOSIS — F32.A ANXIETY AND DEPRESSION: ICD-10-CM

## 2022-11-21 PROCEDURE — 99212 PR OFFICE/OUTPT VISIT, EST, LEVL II, 10-19 MIN: ICD-10-PCS | Mod: TH,S$GLB,, | Performed by: OBSTETRICS & GYNECOLOGY

## 2022-11-21 PROCEDURE — 99212 OFFICE O/P EST SF 10 MIN: CPT | Mod: TH,S$GLB,, | Performed by: OBSTETRICS & GYNECOLOGY

## 2022-11-21 RX ORDER — HYDROXYZINE PAMOATE 25 MG/1
CAPSULE ORAL
Qty: 30 CAPSULE | Refills: 0 | Status: SHIPPED | OUTPATIENT
Start: 2022-11-21 | End: 2023-01-18

## 2022-11-21 RX ORDER — FLUOXETINE HYDROCHLORIDE 40 MG/1
40 CAPSULE ORAL DAILY
Qty: 90 CAPSULE | Refills: 3 | Status: SHIPPED | OUTPATIENT
Start: 2022-11-21 | End: 2023-01-18 | Stop reason: SDUPTHER

## 2022-11-21 RX ORDER — BUPROPION HYDROCHLORIDE 150 MG/1
150 TABLET ORAL DAILY
Qty: 90 TABLET | Refills: 3 | Status: SHIPPED | OUTPATIENT
Start: 2022-11-21 | End: 2023-01-18 | Stop reason: SDUPTHER
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
## 2022-11-29 ENCOUNTER — ROUTINE PRENATAL (OUTPATIENT)
Dept: OBSTETRICS AND GYNECOLOGY | Facility: CLINIC | Age: 27
End: 2022-11-29
Payer: MEDICAID

## 2022-11-29 VITALS
HEART RATE: 97 BPM | BODY MASS INDEX: 43.4 KG/M2 | DIASTOLIC BLOOD PRESSURE: 84 MMHG | WEIGHT: 245 LBS | SYSTOLIC BLOOD PRESSURE: 134 MMHG

## 2022-11-29 DIAGNOSIS — Z34.90 PREGNANCY, UNSPECIFIED GESTATIONAL AGE: Primary | ICD-10-CM

## 2022-11-29 PROCEDURE — 99212 OFFICE O/P EST SF 10 MIN: CPT | Mod: TH,S$GLB,, | Performed by: OBSTETRICS & GYNECOLOGY

## 2022-11-29 PROCEDURE — 99212 PR OFFICE/OUTPT VISIT, EST, LEVL II, 10-19 MIN: ICD-10-PCS | Mod: TH,S$GLB,, | Performed by: OBSTETRICS & GYNECOLOGY

## 2022-11-29 NOTE — PROGRESS NOTES
27 y.o. female  at 36w0d  Reports + FM, denies VB, Leaking or regular CTX  Doing well   TW  GBS collected    Reviewed warning signs, normal fetal movements, labor precautions discussed in detail   RTC 1 wks, or sooner prn. Labor and delivery PRN   Chaperone was present for pelvic exam    Occasional contractions that come & go

## 2022-12-01 LAB
GROUP B STREP MOLECULAR: NEGATIVE
PENICILLIN ALLERGIC: NO

## 2022-12-07 ENCOUNTER — OUTSIDE PLACE OF SERVICE (OUTPATIENT)
Dept: OBSTETRICS AND GYNECOLOGY | Facility: CLINIC | Age: 27
End: 2022-12-07

## 2022-12-07 ENCOUNTER — ROUTINE PRENATAL (OUTPATIENT)
Dept: OBSTETRICS AND GYNECOLOGY | Facility: CLINIC | Age: 27
End: 2022-12-07
Payer: MEDICAID

## 2022-12-07 VITALS
BODY MASS INDEX: 43.75 KG/M2 | WEIGHT: 247 LBS | HEART RATE: 92 BPM | DIASTOLIC BLOOD PRESSURE: 92 MMHG | SYSTOLIC BLOOD PRESSURE: 142 MMHG

## 2022-12-07 DIAGNOSIS — Z34.90 PREGNANCY, UNSPECIFIED GESTATIONAL AGE: Primary | ICD-10-CM

## 2022-12-07 DIAGNOSIS — O26.841 UTERINE SIZE DATE DISCREPANCY, FIRST TRIMESTER: ICD-10-CM

## 2022-12-07 LAB
ANION GAP SERPL CALC-SCNC: 10 MMOL/L (ref 3–11)
ANISOCYTOSIS: ABNORMAL
CELLS COUNTED: 100
CHLORIDE SERPL-SCNC: 104 MMOL/L (ref 100–108)
CO2 SERPL-SCNC: 23 MMOL/L (ref 21–32)
EOSINOPHIL NFR BLD: 1 % (ref 1–3)
ERYTHROCYTE [DISTWIDTH] IN BLOOD BY AUTOMATED COUNT: 20.5 % (ref 12.5–18)
HCT VFR BLD AUTO: 27.4 % (ref 37–47)
HGB BLD-MCNC: 7.7 G/DL (ref 12–16)
HYPOCHROMIA BLD QL SMEAR: ABNORMAL
LYMPHOCYTES NFR BLD: 19 % (ref 25–40)
MCH RBC QN AUTO: 19.6 PG (ref 27–31.2)
MCHC RBC AUTO-ENTMCNC: 28.1 G/DL (ref 31.8–35.4)
MCV RBC AUTO: 69.9 FL (ref 80–97)
MICROCYTES BLD QL SMEAR: ABNORMAL
MONOCYTES NFR BLD: 8 % (ref 1–15)
NEUTROPHILS # BLD AUTO: 11 10*3/UL (ref 1.8–7.7)
NEUTROPHILS NFR BLD: 72 % (ref 37–80)
NUCLEATED RED BLOOD CELLS: 0.1 %
PLATELETS: 224 10*3/UL (ref 142–424)
POTASSIUM SERPL-SCNC: 3.9 MMOL/L (ref 3.6–5.2)
RBC # BLD AUTO: 3.92 10*6/UL (ref 4.2–5.4)
SMALL PLATELETS BLD QL SMEAR: ADEQUATE
SODIUM BLD-SCNC: 137 MMOL/L (ref 135–145)
WBC # BLD: 15.3 10*3/UL (ref 4.6–10.2)

## 2022-12-07 PROCEDURE — 99212 PR OFFICE/OUTPT VISIT, EST, LEVL II, 10-19 MIN: ICD-10-PCS | Mod: TH,S$PBB,, | Performed by: OBSTETRICS & GYNECOLOGY

## 2022-12-07 PROCEDURE — 59514 PR CESAREAN DELIVERY ONLY: ICD-10-PCS | Mod: AT,,, | Performed by: OBSTETRICS & GYNECOLOGY

## 2022-12-07 PROCEDURE — 99212 OFFICE O/P EST SF 10 MIN: CPT | Mod: TH,S$PBB,, | Performed by: OBSTETRICS & GYNECOLOGY

## 2022-12-07 PROCEDURE — 59514 CESAREAN DELIVERY ONLY: CPT | Mod: AT,,, | Performed by: OBSTETRICS & GYNECOLOGY

## 2022-12-07 NOTE — PROGRESS NOTES
Discussed possible tubal     She does not want to be come pregnant and she is not interested in a

## 2022-12-07 NOTE — PROGRESS NOTES
27 y.o. female  at 37w1d  Reports + FM, denies VB, Leaking or regular CTX  Doing well without   TWG:   GBS collected    Reviewed warning signs, normal fetal movements, labor precautions discussed in detail   RTC 1 wks, or sooner prn. Labor and delivery PRN   Chaperone was present for pelvic exam

## 2022-12-08 LAB
ERYTHROCYTE [DISTWIDTH] IN BLOOD BY AUTOMATED COUNT: 20.4 % (ref 12.5–18)
HCT VFR BLD AUTO: 27.2 % (ref 37–47)
HGB BLD-MCNC: 7.6 G/DL (ref 12–16)
MCH RBC QN AUTO: 19.4 PG (ref 27–31.2)
MCHC RBC AUTO-ENTMCNC: 27.9 G/DL (ref 31.8–35.4)
MCV RBC AUTO: 69.6 FL (ref 80–97)
NUCLEATED RED BLOOD CELLS: 0 %
PLATELETS: 190 10*3/UL (ref 142–424)
RBC # BLD AUTO: 3.91 10*6/UL (ref 4.2–5.4)
RPR: NON REACTIVE
WBC # BLD: 21.3 10*3/UL (ref 4.6–10.2)

## 2022-12-08 PROCEDURE — 99231 PR SUBSEQUENT HOSPITAL CARE,LEVL I: ICD-10-PCS | Mod: ,,, | Performed by: OBSTETRICS & GYNECOLOGY

## 2022-12-08 PROCEDURE — 99231 SBSQ HOSP IP/OBS SF/LOW 25: CPT | Mod: ,,, | Performed by: OBSTETRICS & GYNECOLOGY

## 2022-12-09 ENCOUNTER — PATIENT MESSAGE (OUTPATIENT)
Dept: OBSTETRICS AND GYNECOLOGY | Facility: CLINIC | Age: 27
End: 2022-12-09
Payer: MEDICAID

## 2022-12-09 ENCOUNTER — OUTSIDE PLACE OF SERVICE (OUTPATIENT)
Dept: OBSTETRICS AND GYNECOLOGY | Facility: CLINIC | Age: 27
End: 2022-12-09
Payer: MEDICAID

## 2022-12-09 PROCEDURE — 99232 PR SUBSEQUENT HOSPITAL CARE,LEVL II: ICD-10-PCS | Mod: ,,, | Performed by: OBSTETRICS & GYNECOLOGY

## 2022-12-09 PROCEDURE — 99232 SBSQ HOSP IP/OBS MODERATE 35: CPT | Mod: ,,, | Performed by: OBSTETRICS & GYNECOLOGY

## 2022-12-10 PROCEDURE — 99238 PR HOSPITAL DISCHARGE DAY,<30 MIN: ICD-10-PCS | Mod: ,,, | Performed by: OBSTETRICS & GYNECOLOGY

## 2022-12-10 PROCEDURE — 99238 HOSP IP/OBS DSCHRG MGMT 30/<: CPT | Mod: ,,, | Performed by: OBSTETRICS & GYNECOLOGY

## 2022-12-13 ENCOUNTER — OFFICE VISIT (OUTPATIENT)
Dept: OBSTETRICS AND GYNECOLOGY | Facility: CLINIC | Age: 27
End: 2022-12-13
Payer: MEDICAID

## 2022-12-13 VITALS
DIASTOLIC BLOOD PRESSURE: 82 MMHG | BODY MASS INDEX: 42.52 KG/M2 | HEIGHT: 63 IN | SYSTOLIC BLOOD PRESSURE: 120 MMHG | HEART RATE: 94 BPM | WEIGHT: 240 LBS

## 2022-12-13 PROCEDURE — 3074F SYST BP LT 130 MM HG: CPT | Mod: CPTII,S$GLB,, | Performed by: OBSTETRICS & GYNECOLOGY

## 2022-12-13 PROCEDURE — 3079F PR MOST RECENT DIASTOLIC BLOOD PRESSURE 80-89 MM HG: ICD-10-PCS | Mod: CPTII,S$GLB,, | Performed by: OBSTETRICS & GYNECOLOGY

## 2022-12-13 PROCEDURE — 3074F PR MOST RECENT SYSTOLIC BLOOD PRESSURE < 130 MM HG: ICD-10-PCS | Mod: CPTII,S$GLB,, | Performed by: OBSTETRICS & GYNECOLOGY

## 2022-12-13 PROCEDURE — 99024 POSTOP FOLLOW-UP VISIT: CPT | Mod: S$GLB,,, | Performed by: OBSTETRICS & GYNECOLOGY

## 2022-12-13 PROCEDURE — 3079F DIAST BP 80-89 MM HG: CPT | Mod: CPTII,S$GLB,, | Performed by: OBSTETRICS & GYNECOLOGY

## 2022-12-13 PROCEDURE — 3008F PR BODY MASS INDEX (BMI) DOCUMENTED: ICD-10-PCS | Mod: CPTII,S$GLB,, | Performed by: OBSTETRICS & GYNECOLOGY

## 2022-12-13 PROCEDURE — 99024 PR POST-OP FOLLOW-UP VISIT: ICD-10-PCS | Mod: S$GLB,,, | Performed by: OBSTETRICS & GYNECOLOGY

## 2022-12-13 PROCEDURE — 1159F MED LIST DOCD IN RCRD: CPT | Mod: CPTII,S$GLB,, | Performed by: OBSTETRICS & GYNECOLOGY

## 2022-12-13 PROCEDURE — 3008F BODY MASS INDEX DOCD: CPT | Mod: CPTII,S$GLB,, | Performed by: OBSTETRICS & GYNECOLOGY

## 2022-12-13 PROCEDURE — 1159F PR MEDICATION LIST DOCUMENTED IN MEDICAL RECORD: ICD-10-PCS | Mod: CPTII,S$GLB,, | Performed by: OBSTETRICS & GYNECOLOGY

## 2022-12-13 NOTE — PROGRESS NOTES
Subjective:       Patient ID: Sachi Loaiza is a 27 y.o. female.    Chief Complaint:  Post-op Evaluation (Post op   one week/Patient is doing well.  Baby is doing well.  She is Bottle feeding . Demies GI or  symptoms)      History of Present Illness  She is doing well.  No new health issues,  No abnormal bleeding, No GI or Gu concerns,  No dyspareunia  Status post  c section    she is doing well  no concern       GYN & OB History  Patient's last menstrual period was 03/10/2022 (exact date).     Date of Last Pap: No result found    OB History    Para Term  AB Living   2 2 1 1 0 2   SAB IAB Ectopic Multiple Live Births   0 0 0 1 2      # Outcome Date GA Lbr Tyler/2nd Weight Sex Delivery Anes PTL Lv   2 Term 22 37w0d  2.722 kg (6 lb) F  EPI N GRETTA      Complications: Hypertension in pregnancy   1A  21 34w0d  2.013 kg (4 lb 7 oz) F CS-Unspec EPI N GRETTA      Complications: Dysfunctional Labor, Preeclampsia   1B  21 34w0d  1.701 kg (3 lb 12 oz) F CS-Unspec EPI N       Complications: Preeclampsia       Review of Systems  Review of Systems          Objective:    Physical Exam:   Constitutional: She appears well-developed.             Abdominal: Soft.   Abdomen is soft and the Incision is dry and intact. Steri strips removed                    Neurological: She is alert.     Psychiatric: Her behavior is normal.        Assessment:        1. 2 weeks postpartum follow-up                 Plan:               Pt is aware we call all results. If she does not hear from our office regarding her result within a week of having a study or procedure performed she is to call the office so that we can research the result for her.  Normal activities discussed.   She is to return for any reason.      Chaperone was present

## 2022-12-29 ENCOUNTER — PATIENT MESSAGE (OUTPATIENT)
Dept: OBSTETRICS AND GYNECOLOGY | Facility: CLINIC | Age: 27
End: 2022-12-29

## 2022-12-29 ENCOUNTER — CLINICAL SUPPORT (OUTPATIENT)
Dept: OBSTETRICS AND GYNECOLOGY | Facility: CLINIC | Age: 27
End: 2022-12-29
Payer: MEDICAID

## 2022-12-29 VITALS — DIASTOLIC BLOOD PRESSURE: 97 MMHG | SYSTOLIC BLOOD PRESSURE: 137 MMHG

## 2022-12-29 NOTE — TELEPHONE ENCOUNTER
I called patient  She is concerned about her symptoms.  Medications Labetalol 200 mg BID   Patient has not been able to take her Blood Pressure.  She is advised to come in now for a Blood Pressure check.

## 2022-12-29 NOTE — PROGRESS NOTES
"  Patient is experiencing a "horrible  Headache"  that is not going away with medicationl Visual disturbances, flurries, Extreme Fatigue, nausea and vomiting. She also is complaining of pain on both sides of her abdomen through to her back.  She is currently taking Labetalol 200 mg BID  Blood Pressure today is  137/97    Dr. Wagner is consulted and orders increase Labetalol from 200 mg Bid to TID.  Discussed medication change with patient.   She is also advised to go to ER at [facility] with any concerns.  Patient acknowledges with verbal understanding  "

## 2023-01-02 DIAGNOSIS — I10 HYPERTENSION, UNSPECIFIED TYPE: Primary | ICD-10-CM

## 2023-01-03 ENCOUNTER — PATIENT MESSAGE (OUTPATIENT)
Dept: OBSTETRICS AND GYNECOLOGY | Facility: CLINIC | Age: 28
End: 2023-01-03
Payer: MEDICAID

## 2023-01-04 ENCOUNTER — PATIENT MESSAGE (OUTPATIENT)
Dept: OBSTETRICS AND GYNECOLOGY | Facility: CLINIC | Age: 28
End: 2023-01-04
Payer: MEDICAID

## 2023-01-04 RX ORDER — LABETALOL 200 MG/1
200 TABLET, FILM COATED ORAL 2 TIMES DAILY
Qty: 60 TABLET | Refills: 3 | Status: SHIPPED | OUTPATIENT
Start: 2023-01-04 | End: 2023-01-18 | Stop reason: SDUPTHER

## 2023-01-10 RX ORDER — LABETALOL 200 MG/1
200 TABLET, FILM COATED ORAL 3 TIMES DAILY
Qty: 90 TABLET | Refills: 2 | Status: CANCELLED | OUTPATIENT
Start: 2023-01-10 | End: 2023-02-09

## 2023-01-18 ENCOUNTER — POSTPARTUM VISIT (OUTPATIENT)
Dept: OBSTETRICS AND GYNECOLOGY | Facility: CLINIC | Age: 28
End: 2023-01-18
Payer: MEDICAID

## 2023-01-18 VITALS
HEART RATE: 85 BPM | WEIGHT: 235 LBS | BODY MASS INDEX: 41.63 KG/M2 | DIASTOLIC BLOOD PRESSURE: 79 MMHG | SYSTOLIC BLOOD PRESSURE: 115 MMHG

## 2023-01-18 DIAGNOSIS — I10 HYPERTENSION, UNSPECIFIED TYPE: ICD-10-CM

## 2023-01-18 DIAGNOSIS — F32.A ANXIETY AND DEPRESSION: ICD-10-CM

## 2023-01-18 DIAGNOSIS — F41.9 ANXIETY AND DEPRESSION: ICD-10-CM

## 2023-01-18 PROCEDURE — 0503F PR POSTPARTUM CARE VISIT: ICD-10-PCS | Mod: CPTII,S$GLB,, | Performed by: OBSTETRICS & GYNECOLOGY

## 2023-01-18 PROCEDURE — 0503F POSTPARTUM CARE VISIT: CPT | Mod: CPTII,S$GLB,, | Performed by: OBSTETRICS & GYNECOLOGY

## 2023-01-18 RX ORDER — ETONOGESTREL AND ETHINYL ESTRADIOL VAGINAL RING .015; .12 MG/D; MG/D
1 RING VAGINAL
Qty: 1 EACH | Refills: 11 | Status: SHIPPED | OUTPATIENT
Start: 2023-01-18 | End: 2024-01-18

## 2023-01-18 RX ORDER — LABETALOL 200 MG/1
200 TABLET, FILM COATED ORAL 2 TIMES DAILY
Qty: 60 TABLET | Refills: 3 | Status: SHIPPED | OUTPATIENT
Start: 2023-01-18 | End: 2024-01-18

## 2023-01-18 RX ORDER — FLUOXETINE HYDROCHLORIDE 40 MG/1
40 CAPSULE ORAL DAILY
Qty: 30 CAPSULE | Refills: 11 | Status: SHIPPED | OUTPATIENT
Start: 2023-01-18 | End: 2024-01-18

## 2023-01-18 RX ORDER — BUPROPION HYDROCHLORIDE 150 MG/1
150 TABLET, EXTENDED RELEASE ORAL DAILY
Qty: 30 TABLET | Refills: 11 | Status: SHIPPED | OUTPATIENT
Start: 2023-01-18 | End: 2024-01-18

## 2023-01-18 RX ORDER — FLUOXETINE HYDROCHLORIDE 20 MG/1
20 CAPSULE ORAL DAILY
Qty: 30 CAPSULE | Refills: 11 | Status: SHIPPED | OUTPATIENT
Start: 2023-01-18

## 2023-01-18 NOTE — PROGRESS NOTES
Subjective:       Patient ID: Sachi Loaiza is a 27 y.o. female.    Chief Complaint:  Postpartum Care (6 WEEKS POST C SECTION. PATIENT STATES SHE IS STILL EXPERIENCING SOME DISCOMFORT NEAR INCISION. SHE WOULD ALSO LIKE TO DISCUSS BIRTH CONTROL OPTIONS. )      History of Present Illness  She is doing well. No abnormal bleeding, No GI or Gu concerns,    No other concerns  Post partum blues/anxiety discussed      GYN & OB History  No LMP recorded.     Date of Last Pap: No result found    OB History    Para Term  AB Living   2 2 1 1 0 2   SAB IAB Ectopic Multiple Live Births   0 0 0 1 2      # Outcome Date GA Lbr Tyler/2nd Weight Sex Delivery Anes PTL Lv   2 Term 22 37w0d  2.722 kg (6 lb) F  EPI N GRETTA      Complications: Hypertension in pregnancy   1A  21 34w0d  2.013 kg (4 lb 7 oz) F CS-Unspec EPI N GRETTA      Complications: Dysfunctional Labor, Preeclampsia   1B  21 34w0d  1.701 kg (3 lb 12 oz) F CS-Unspec EPI N       Complications: Preeclampsia       Review of Systems  Review of Systems   Constitutional:  Negative for activity change.   Eyes:  Negative for visual disturbance.   Respiratory:  Negative for shortness of breath.    Cardiovascular:  Negative for chest pain.   Gastrointestinal:  Negative for abdominal pain.   Genitourinary:  Negative for vaginal bleeding.        No abnormal vaginal bleeding   Musculoskeletal:  Negative for back pain.   Integumentary:  Negative for rash and breast mass.   Neurological:  Negative for numbness.   Psychiatric/Behavioral:          No mood disturbance or changes    Breast: Negative for mass          Objective:    Physical Exam:   Constitutional: She appears well-developed.             Abdominal: Soft.   Incision is dry intact, and no evidence of infection     Genitourinary:    Vagina and uterus normal.   There is no tenderness on the right labia. There is no tenderness on the left labia. Right adnexum displays no fullness. Left adnexum  displays no fullness.               Neurological: She is alert.     Psychiatric: She has a normal mood and affect. Her speech is normal and behavior is normal.     Her incision looks very good.  Healing nicely  no evidence of infection    No sig abdominal tenderness     Pelvic exam  Vulva normal, Vagina normal,  Uterus is normal size, non tender and no adnexal masses.       Assessment:        1. 6 weeks postpartum follow-up                 Plan:             She is to return for any reason.     Birth control is discussed  She is to follow up for her annual pap and exam.  This appointment is set with her before leaving today  Chaperone was present for the exam

## 2023-02-19 ENCOUNTER — PATIENT MESSAGE (OUTPATIENT)
Dept: OBSTETRICS AND GYNECOLOGY | Facility: CLINIC | Age: 28
End: 2023-02-19
Payer: MEDICAID

## 2023-02-20 DIAGNOSIS — B37.31 VAGINAL YEAST INFECTION: Primary | ICD-10-CM

## 2023-02-20 RX ORDER — FLUCONAZOLE 150 MG/1
150 TABLET ORAL EVERY OTHER DAY
Qty: 2 TABLET | Refills: 2 | Status: SHIPPED | OUTPATIENT
Start: 2023-02-20 | End: 2023-02-23

## 2023-03-16 ENCOUNTER — PATIENT MESSAGE (OUTPATIENT)
Dept: OBSTETRICS AND GYNECOLOGY | Facility: CLINIC | Age: 28
End: 2023-03-16
Payer: MEDICAID

## 2023-03-16 DIAGNOSIS — B37.31 VAGINAL YEAST INFECTION: Primary | ICD-10-CM

## 2023-03-16 RX ORDER — FLUCONAZOLE 150 MG/1
150 TABLET ORAL EVERY OTHER DAY
Qty: 2 TABLET | Refills: 2 | Status: SHIPPED | OUTPATIENT
Start: 2023-03-16 | End: 2023-03-19

## 2023-03-16 NOTE — TELEPHONE ENCOUNTER
Patient c/o recurrent vaginal yeast. She is requesting medication. She has also been scheduled for evaluation on Monday. Allergies reviewed. Pharmacy up to date. Medication pending. Please approve.

## 2023-03-20 ENCOUNTER — OFFICE VISIT (OUTPATIENT)
Dept: OBSTETRICS AND GYNECOLOGY | Facility: CLINIC | Age: 28
End: 2023-03-20
Payer: MEDICAID

## 2023-03-20 VITALS
WEIGHT: 235 LBS | HEART RATE: 86 BPM | BODY MASS INDEX: 41.64 KG/M2 | SYSTOLIC BLOOD PRESSURE: 143 MMHG | DIASTOLIC BLOOD PRESSURE: 89 MMHG | HEIGHT: 63 IN

## 2023-03-20 DIAGNOSIS — N39.0 URINARY TRACT INFECTION WITHOUT HEMATURIA, SITE UNSPECIFIED: Primary | ICD-10-CM

## 2023-03-20 DIAGNOSIS — B37.9 YEAST INFECTION: ICD-10-CM

## 2023-03-20 DIAGNOSIS — N81.4 UTERINE PROLAPSE: ICD-10-CM

## 2023-03-20 PROCEDURE — 3077F SYST BP >= 140 MM HG: CPT | Mod: CPTII,S$GLB,, | Performed by: OBSTETRICS & GYNECOLOGY

## 2023-03-20 PROCEDURE — 1159F MED LIST DOCD IN RCRD: CPT | Mod: CPTII,S$GLB,, | Performed by: OBSTETRICS & GYNECOLOGY

## 2023-03-20 PROCEDURE — 3008F PR BODY MASS INDEX (BMI) DOCUMENTED: ICD-10-PCS | Mod: CPTII,S$GLB,, | Performed by: OBSTETRICS & GYNECOLOGY

## 2023-03-20 PROCEDURE — 3008F BODY MASS INDEX DOCD: CPT | Mod: CPTII,S$GLB,, | Performed by: OBSTETRICS & GYNECOLOGY

## 2023-03-20 PROCEDURE — 1159F PR MEDICATION LIST DOCUMENTED IN MEDICAL RECORD: ICD-10-PCS | Mod: CPTII,S$GLB,, | Performed by: OBSTETRICS & GYNECOLOGY

## 2023-03-20 PROCEDURE — 3079F DIAST BP 80-89 MM HG: CPT | Mod: CPTII,S$GLB,, | Performed by: OBSTETRICS & GYNECOLOGY

## 2023-03-20 PROCEDURE — 99213 PR OFFICE/OUTPT VISIT, EST, LEVL III, 20-29 MIN: ICD-10-PCS | Mod: S$GLB,,, | Performed by: OBSTETRICS & GYNECOLOGY

## 2023-03-20 PROCEDURE — 3077F PR MOST RECENT SYSTOLIC BLOOD PRESSURE >= 140 MM HG: ICD-10-PCS | Mod: CPTII,S$GLB,, | Performed by: OBSTETRICS & GYNECOLOGY

## 2023-03-20 PROCEDURE — 3079F PR MOST RECENT DIASTOLIC BLOOD PRESSURE 80-89 MM HG: ICD-10-PCS | Mod: CPTII,S$GLB,, | Performed by: OBSTETRICS & GYNECOLOGY

## 2023-03-20 PROCEDURE — 99213 OFFICE O/P EST LOW 20 MIN: CPT | Mod: S$GLB,,, | Performed by: OBSTETRICS & GYNECOLOGY

## 2023-03-20 RX ORDER — NITROFURANTOIN 25; 75 MG/1; MG/1
100 CAPSULE ORAL 2 TIMES DAILY
Qty: 14 CAPSULE | Refills: 0 | Status: SHIPPED | OUTPATIENT
Start: 2023-03-20 | End: 2023-03-27

## 2023-03-20 NOTE — PROGRESS NOTES
Subjective:       Patient ID: Sachi Loaiza is a 27 y.o. female.    Chief Complaint:  recurrent yeast       History of Present Illness  She is here to evaluate she is having recurrent yeast infections.    She has treated in the recent past with the diflucan.    No dyspareunia.  Her  is doing better but seeing a neurologist    She is having some pelvic pain in her vagina when she stands up abruptly    she is on the nuvaring.         GYN & OB History  Patient's last menstrual period was 2023 (approximate).     Date of Last Pap: No result found    OB History    Para Term  AB Living   2 2 1 1 0 2   SAB IAB Ectopic Multiple Live Births   0 0 0 1 2      # Outcome Date GA Lbr Tyler/2nd Weight Sex Delivery Anes PTL Lv   2 Term 22 37w0d  2.722 kg (6 lb) F  EPI N GRETTA      Complications: Hypertension in pregnancy   1A  21 34w0d  2.013 kg (4 lb 7 oz) F CS-Unspec EPI N GRETTA      Complications: Dysfunctional Labor, Preeclampsia   1B  21 34w0d  1.701 kg (3 lb 12 oz) F CS-Unspec EPI N       Complications: Preeclampsia       Review of Systems  Review of Systems   Constitutional:  Negative for activity change.   Eyes:  Negative for visual disturbance.   Respiratory:  Negative for shortness of breath.    Cardiovascular:  Negative for chest pain.   Gastrointestinal:  Negative for abdominal pain.   Genitourinary:  Positive for vaginal discharge. Negative for vaginal bleeding.        No abnormal vaginal bleeding   Musculoskeletal:  Negative for back pain.   Integumentary:  Negative for rash and breast mass.   Neurological:  Negative for numbness.   Psychiatric/Behavioral:          No mood disturbance or changes    Breast: Negative for mass          Objective:    Physical Exam:   Constitutional: She appears well-developed.               Genitourinary:    Vagina, uterus, right adnexa and left adnexa normal.      Pelvic exam was performed with patient supine.   Labial bartholins  normal.There is no tenderness or lesion on the right labia. There is no tenderness or lesion on the left labia. Cervix is normal. There is unspecified prolapse of vaginal walls (her cx comes to within 1.5 cm of the introitus) in the vagina.               Neurological: She is alert.          Assessment:        1. Yeast infection    2. Uterine prolapse                 Plan:         She has uterine prolapse   she will consider a TLH and let me know.       Pap discussed will return for her annual     Pt is aware we call all results. If she does not hear from our office regarding her result within a week of having a study or procedure performed she is to call the office so that we can research the result for her.  Discussed follow up.  She is to rtn if her symptoms do not resolve or if persist  Chaperone was present

## 2023-03-21 ENCOUNTER — PROCEDURE VISIT (OUTPATIENT)
Dept: OBSTETRICS AND GYNECOLOGY | Facility: CLINIC | Age: 28
End: 2023-03-21
Payer: MEDICAID

## 2023-03-21 DIAGNOSIS — N81.4 UTERINE PROLAPSE: ICD-10-CM

## 2023-03-21 LAB
AMORPH URATE CRY URNS QL MICRO: ABNORMAL
BACTERIA #/AREA URNS HPF: ABNORMAL /[HPF]
BILIRUB UR QL STRIP: NEGATIVE
CLARITY UR: ABNORMAL
COLOR UR: YELLOW
EPITHELIAL CELLS: ABNORMAL
GLUCOSE (UA): NEGATIVE MG/DL
KETONES UR QL STRIP: NEGATIVE MG/DL
LEUKOCYTE ESTERASE UR QL STRIP: ABNORMAL
MUCOUS THREADS URNS QL MICRO: NEGATIVE
NITRITE UR QL STRIP: NEGATIVE
OCCULT BLOOD: NEGATIVE
PH, URINE: 5 (ref 5–7.5)
PROT UR QL STRIP: 25 MG/DL
RBC/HPF: NEGATIVE
SP GR UR STRIP: 1.02 (ref 1–1.03)
URINE CULTURE, ROUTINE: NORMAL
UROBILINOGEN, URINE: NORMAL E.U./DL (ref 0–1)
WBC/HPF: ABNORMAL

## 2023-03-21 PROCEDURE — 76830 US OB/GYN PROCEDURE (VIEWPOINT): ICD-10-PCS | Mod: S$GLB,,, | Performed by: OBSTETRICS & GYNECOLOGY

## 2023-03-21 PROCEDURE — 76830 TRANSVAGINAL US NON-OB: CPT | Mod: S$GLB,,, | Performed by: OBSTETRICS & GYNECOLOGY

## 2023-03-22 ENCOUNTER — PATIENT MESSAGE (OUTPATIENT)
Dept: OBSTETRICS AND GYNECOLOGY | Facility: CLINIC | Age: 28
End: 2023-03-22
Payer: MEDICAID

## 2023-03-22 LAB — CULTURE: NORMAL

## 2023-05-05 ENCOUNTER — PATIENT MESSAGE (OUTPATIENT)
Dept: OBSTETRICS AND GYNECOLOGY | Facility: CLINIC | Age: 28
End: 2023-05-05
Payer: MEDICAID

## 2023-05-23 ENCOUNTER — PATIENT MESSAGE (OUTPATIENT)
Dept: OBSTETRICS AND GYNECOLOGY | Facility: CLINIC | Age: 28
End: 2023-05-23

## 2023-06-20 ENCOUNTER — PATIENT MESSAGE (OUTPATIENT)
Dept: OBSTETRICS AND GYNECOLOGY | Facility: CLINIC | Age: 28
End: 2023-06-20
Payer: MEDICAID

## 2023-06-29 ENCOUNTER — OFFICE VISIT (OUTPATIENT)
Dept: OBSTETRICS AND GYNECOLOGY | Facility: CLINIC | Age: 28
End: 2023-06-29
Payer: MEDICAID

## 2023-06-29 VITALS
WEIGHT: 232.19 LBS | SYSTOLIC BLOOD PRESSURE: 131 MMHG | DIASTOLIC BLOOD PRESSURE: 74 MMHG | HEART RATE: 70 BPM | BODY MASS INDEX: 41.14 KG/M2 | HEIGHT: 63 IN

## 2023-06-29 DIAGNOSIS — N92.1 MENORRHAGIA WITH IRREGULAR CYCLE: ICD-10-CM

## 2023-06-29 DIAGNOSIS — Z01.419 WELL WOMAN EXAM WITH ROUTINE GYNECOLOGICAL EXAM: ICD-10-CM

## 2023-06-29 DIAGNOSIS — N94.10 DYSPAREUNIA IN FEMALE: ICD-10-CM

## 2023-06-29 DIAGNOSIS — Z01.419 WELL WOMAN EXAM: Primary | ICD-10-CM

## 2023-06-29 DIAGNOSIS — R10.2 PELVIC PAIN: ICD-10-CM

## 2023-06-29 DIAGNOSIS — N81.4 UTERINE PROLAPSE: ICD-10-CM

## 2023-06-29 DIAGNOSIS — N94.6 DYSMENORRHEA: ICD-10-CM

## 2023-06-29 PROCEDURE — 3078F PR MOST RECENT DIASTOLIC BLOOD PRESSURE < 80 MM HG: ICD-10-PCS | Mod: CPTII,S$GLB,, | Performed by: OBSTETRICS & GYNECOLOGY

## 2023-06-29 PROCEDURE — 99395 PREV VISIT EST AGE 18-39: CPT | Mod: S$GLB,,, | Performed by: OBSTETRICS & GYNECOLOGY

## 2023-06-29 PROCEDURE — 99395 PR PREVENTIVE VISIT,EST,18-39: ICD-10-PCS | Mod: S$GLB,,, | Performed by: OBSTETRICS & GYNECOLOGY

## 2023-06-29 PROCEDURE — 3075F SYST BP GE 130 - 139MM HG: CPT | Mod: CPTII,S$GLB,, | Performed by: OBSTETRICS & GYNECOLOGY

## 2023-06-29 PROCEDURE — 3008F BODY MASS INDEX DOCD: CPT | Mod: CPTII,S$GLB,, | Performed by: OBSTETRICS & GYNECOLOGY

## 2023-06-29 PROCEDURE — 1159F MED LIST DOCD IN RCRD: CPT | Mod: CPTII,S$GLB,, | Performed by: OBSTETRICS & GYNECOLOGY

## 2023-06-29 PROCEDURE — 3078F DIAST BP <80 MM HG: CPT | Mod: CPTII,S$GLB,, | Performed by: OBSTETRICS & GYNECOLOGY

## 2023-06-29 PROCEDURE — 3008F PR BODY MASS INDEX (BMI) DOCUMENTED: ICD-10-PCS | Mod: CPTII,S$GLB,, | Performed by: OBSTETRICS & GYNECOLOGY

## 2023-06-29 PROCEDURE — 1159F PR MEDICATION LIST DOCUMENTED IN MEDICAL RECORD: ICD-10-PCS | Mod: CPTII,S$GLB,, | Performed by: OBSTETRICS & GYNECOLOGY

## 2023-06-29 PROCEDURE — 3075F PR MOST RECENT SYSTOLIC BLOOD PRESS GE 130-139MM HG: ICD-10-PCS | Mod: CPTII,S$GLB,, | Performed by: OBSTETRICS & GYNECOLOGY

## 2023-06-29 NOTE — PROGRESS NOTES
Subjective:       Patient ID: Sachi Loaiza is a 28 y.o. female.    Chief Complaint:  Well Woman (Patient is here for her annual exam/LMP  2023Reports menorrhagia  Bleeding three weeks out of the month   She uses Nuvaring/Reports painful intercourse/Patient  would like to discuss a Hysterectomy/She has an appointment with her PCP for Anxiety)      History of Present Illness  She is doing well.  No new health issues,  No abnormal bleeding, No GI or Gu concerns,  No dyspareunia  She has pelvic pain and dyspareunia   she is not having sex secondary to pain   in addition she is having menorrhagia and clots   and dysmenorrhea. No GI or  concerns     HPI      GYN & OB History  Patient's last menstrual period was 2023 (exact date).     Date of Last Pap: No result found    OB History    Para Term  AB Living   2 2 1 1 0 2   SAB IAB Ectopic Multiple Live Births   0 0 0 1 2      # Outcome Date GA Lbr Tyler/2nd Weight Sex Delivery Anes PTL Lv   2 Term 22 37w0d  2.722 kg (6 lb) F  EPI N GRETTA      Complications: Hypertension in pregnancy   1A  21 34w0d  2.013 kg (4 lb 7 oz) F CS-Unspec EPI N GRETTA      Complications: Dysfunctional Labor, Preeclampsia   1B  21 34w0d  1.701 kg (3 lb 12 oz) F CS-Unspec EPI N       Complications: Preeclampsia       Review of Systems  Review of Systems   Genitourinary:  Positive for dysmenorrhea, dyspareunia, menorrhagia and pelvic pain.           Objective:    Physical Exam:   Constitutional: She is oriented to person, place, and time. She appears well-developed. She is cooperative.    HENT:   Head: Normocephalic.     Neck: Trachea normal. No thyromegaly present.    Cardiovascular:  Normal rate, regular rhythm and normal heart sounds.             Pulmonary/Chest: Effort normal and breath sounds normal. Right breast exhibits no mass, no nipple discharge and no skin change. Left breast exhibits no mass, no nipple discharge and no skin change.         Abdominal: Soft. There is no abdominal tenderness. There is no rebound and no guarding.     Genitourinary:    Vagina, right adnexa and left adnexa normal.      Pelvic exam was performed with patient supine.   Labial bartholins normal.There is no lesion on the right labia. There is no lesion on the left labia. Cervix is normal. Right adnexum displays no mass and no tenderness. Left adnexum displays no mass and no tenderness. Cervix exhibits no discharge. Uterus is tender. Uterus is not enlarged. Uterine prolapse: her cx comes to within two cm of the introitus.             Lymphadenopathy:        Head (right side): No submental and no submandibular adenopathy present.        Head (left side): No submental and no submandibular adenopathy present.     She has no cervical adenopathy.    Neurological: She is alert and oriented to person, place, and time.    Skin: Skin is warm.    Psychiatric: She has a normal mood and affect. Her speech is normal and behavior is normal. Thought content normal.        Assessment:        1. Well woman exam    2. Menorrhagia with irregular cycle    3. Dyspareunia in female    4. Pelvic pain    5. Well woman exam with routine gynecological exam    6. Dysmenorrhea    7. Uterine prolapse                 Plan:         Needs U/s and her pap done   TLH Bilateral salpingectomy and poss uterosacral lig plication and cysto   Pap discussed will return for her annual     Pt is aware we call all results. If she does not hear from our office regarding her result within a week of having a study or procedure performed she is to call the office so that we can research the result for her.  Birth control discussed  Chaperone was present

## 2023-07-03 LAB — Lab: NORMAL

## 2023-07-03 RX ORDER — FLUCONAZOLE 150 MG/1
150 TABLET ORAL EVERY OTHER DAY
Qty: 2 TABLET | Refills: 2 | Status: SHIPPED | OUTPATIENT
Start: 2023-07-03 | End: 2023-07-10

## 2023-07-19 ENCOUNTER — PROCEDURE VISIT (OUTPATIENT)
Dept: OBSTETRICS AND GYNECOLOGY | Facility: CLINIC | Age: 28
End: 2023-07-19
Payer: MEDICAID

## 2023-07-19 ENCOUNTER — PATIENT MESSAGE (OUTPATIENT)
Dept: OBSTETRICS AND GYNECOLOGY | Facility: CLINIC | Age: 28
End: 2023-07-19
Payer: MEDICAID

## 2023-07-19 DIAGNOSIS — R10.2 PELVIC PAIN: ICD-10-CM

## 2023-07-19 DIAGNOSIS — N92.1 MENORRHAGIA WITH IRREGULAR CYCLE: ICD-10-CM

## 2023-07-19 DIAGNOSIS — N94.10 DYSPAREUNIA IN FEMALE: ICD-10-CM

## 2023-07-19 PROCEDURE — 76830 US OB/GYN PROCEDURE (VIEWPOINT): ICD-10-PCS | Mod: S$GLB,,, | Performed by: OBSTETRICS & GYNECOLOGY

## 2023-07-19 PROCEDURE — 76830 TRANSVAGINAL US NON-OB: CPT | Mod: S$GLB,,, | Performed by: OBSTETRICS & GYNECOLOGY

## 2023-07-20 DIAGNOSIS — B37.9 YEAST INFECTION: Primary | ICD-10-CM

## 2023-07-20 RX ORDER — FLUCONAZOLE 150 MG/1
150 TABLET ORAL EVERY OTHER DAY
Qty: 2 TABLET | Refills: 2 | Status: SHIPPED | OUTPATIENT
Start: 2023-07-20 | End: 2023-07-31

## 2023-07-20 NOTE — TELEPHONE ENCOUNTER
Patient is complaining of a Yeast Infection  Allergy:  Sulfa  [facility]

## 2023-07-31 ENCOUNTER — PATIENT MESSAGE (OUTPATIENT)
Dept: OBSTETRICS AND GYNECOLOGY | Facility: CLINIC | Age: 28
End: 2023-07-31
Payer: MEDICAID

## 2023-08-02 ENCOUNTER — PATIENT MESSAGE (OUTPATIENT)
Dept: OBSTETRICS AND GYNECOLOGY | Facility: CLINIC | Age: 28
End: 2023-08-02
Payer: MEDICAID

## 2023-10-12 ENCOUNTER — PATIENT MESSAGE (OUTPATIENT)
Dept: OBSTETRICS AND GYNECOLOGY | Facility: CLINIC | Age: 28
End: 2023-10-12
Payer: MEDICAID

## 2023-10-12 DIAGNOSIS — B37.31 VAGINAL YEAST INFECTION: Primary | ICD-10-CM

## 2023-10-12 RX ORDER — FLUCONAZOLE 150 MG/1
150 TABLET ORAL EVERY OTHER DAY
Qty: 2 TABLET | Refills: 2 | Status: SHIPPED | OUTPATIENT
Start: 2023-10-12 | End: 2023-10-23

## 2023-10-12 NOTE — TELEPHONE ENCOUNTER
Patient c/o vaginal yeast infection, requesting medication. Advised we will treat but if symptoms worsen or do not improve she is to let us know so we can evaluate.  Allergies reviewed. Pharmacy up to date. Medication pending. Please approve.

## 2023-10-17 DIAGNOSIS — B37.31 VAGINAL YEAST INFECTION: Primary | ICD-10-CM

## 2023-10-17 RX ORDER — FLUCONAZOLE 100 MG/1
100 TABLET ORAL DAILY
Qty: 3 TABLET | Refills: 0 | Status: SHIPPED | OUTPATIENT
Start: 2023-10-17 | End: 2023-10-20

## 2023-10-17 NOTE — TELEPHONE ENCOUNTER
----- Message from Beulah Bolanos sent at 10/17/2023  9:04 AM CDT -----  Contact: Antwan with [facility] [place] [facility] [address]  Type: Staff Message  Caller: Antwan with [facility] [place] pharmacy on [address]  Call Back Number: [phone]  Nature of the Call: pt medication is on a back order Fluconazole 150 mg  Additional Information: na

## 2023-12-21 NOTE — TELEPHONE ENCOUNTER
----- Message from Zara Zavala sent at 12/17/2020 10:13 AM CST -----  Pt would like return call regarding prior authorization for  prescription.  Please call back at [phone].  Md Burak     Allergies reviewed.  H&P note has been confirmed for the patient. Procedural consent has been signed.  Staff has reviewed the patient's labs.

## 2024-04-16 ENCOUNTER — PATIENT MESSAGE (OUTPATIENT)
Dept: OBSTETRICS AND GYNECOLOGY | Facility: CLINIC | Age: 29
End: 2024-04-16
Payer: MEDICAID

## 2024-04-16 DIAGNOSIS — N39.0 URINARY TRACT INFECTION WITHOUT HEMATURIA, SITE UNSPECIFIED: ICD-10-CM

## 2024-04-16 DIAGNOSIS — R39.89 URINARY PROBLEM: Primary | ICD-10-CM

## 2024-04-16 RX ORDER — NITROFURANTOIN 25; 75 MG/1; MG/1
100 CAPSULE ORAL 2 TIMES DAILY
Qty: 14 CAPSULE | Refills: 0 | Status: CANCELLED | OUTPATIENT
Start: 2024-04-16 | End: 2024-04-23

## 2024-04-17 ENCOUNTER — TELEPHONE (OUTPATIENT)
Dept: OBSTETRICS AND GYNECOLOGY | Facility: CLINIC | Age: 29
End: 2024-04-17
Payer: MEDICAID

## 2024-04-17 LAB
AMORPH URATE CRY URNS QL MICRO: NEGATIVE
BACTERIA #/AREA URNS HPF: ABNORMAL /[HPF]
BILIRUB UR QL STRIP: NEGATIVE
CLARITY UR: ABNORMAL
COLOR UR: YELLOW
EPITHELIAL CELLS: ABNORMAL
GLUCOSE (UA): NEGATIVE MG/DL
KETONES UR QL STRIP: NEGATIVE MG/DL
LEUKOCYTE ESTERASE UR QL STRIP: ABNORMAL
MUCOUS THREADS URNS QL MICRO: NEGATIVE
NITRITE UR QL STRIP: NEGATIVE
OCCULT BLOOD: ABNORMAL
PH, URINE: 8 (ref 5–7.5)
PROT UR QL STRIP: 25 MG/DL
RBC/HPF: ABNORMAL
SP GR UR STRIP: 1.01 (ref 1–1.03)
URINE CULTURE, ROUTINE: NORMAL
UROBILINOGEN, URINE: NORMAL E.U./DL (ref 0–1)
WBC/HPF: ABNORMAL

## 2024-04-17 NOTE — TELEPHONE ENCOUNTER
Spoke with patient she would like to come in tomorrow at 11. I told her that she can come see kendrick tomorrow at 11. If her bladder or symptoms come any worse to please go to the Er or Urgent care. Patient verbalized understanding.

## 2024-04-17 NOTE — TELEPHONE ENCOUNTER
Spoke with patient. She will go to an ER now and have  meet her for her symptoms. Charmaine and staff think that this could be a Kidney stone. There at the ER they will be able to do more testing than what we can here at the office. She will keep us updated with her testing.

## 2024-06-28 ENCOUNTER — PATIENT MESSAGE (OUTPATIENT)
Dept: OBSTETRICS AND GYNECOLOGY | Facility: CLINIC | Age: 29
End: 2024-06-28
Payer: MEDICAID

## 2024-07-02 ENCOUNTER — OFFICE VISIT (OUTPATIENT)
Dept: OBSTETRICS AND GYNECOLOGY | Facility: CLINIC | Age: 29
End: 2024-07-02
Payer: MEDICAID

## 2024-07-02 VITALS
BODY MASS INDEX: 43.22 KG/M2 | HEART RATE: 59 BPM | SYSTOLIC BLOOD PRESSURE: 130 MMHG | WEIGHT: 244 LBS | DIASTOLIC BLOOD PRESSURE: 87 MMHG

## 2024-07-02 DIAGNOSIS — Z01.89 ENCOUNTER FOR LABORATORY TEST: ICD-10-CM

## 2024-07-02 DIAGNOSIS — F52.0 HYPOACTIVE SEXUAL DESIRE DISORDER: ICD-10-CM

## 2024-07-02 DIAGNOSIS — Z87.42 HISTORY OF PCOS: ICD-10-CM

## 2024-07-02 DIAGNOSIS — Z01.419 WELL WOMAN EXAM WITH ROUTINE GYNECOLOGICAL EXAM: Primary | ICD-10-CM

## 2024-07-02 PROCEDURE — 3008F BODY MASS INDEX DOCD: CPT | Mod: CPTII,S$GLB,,

## 2024-07-02 PROCEDURE — 1159F MED LIST DOCD IN RCRD: CPT | Mod: CPTII,S$GLB,,

## 2024-07-02 PROCEDURE — 99395 PREV VISIT EST AGE 18-39: CPT | Mod: S$GLB,,,

## 2024-07-02 PROCEDURE — 3075F SYST BP GE 130 - 139MM HG: CPT | Mod: CPTII,S$GLB,,

## 2024-07-02 PROCEDURE — 1160F RVW MEDS BY RX/DR IN RCRD: CPT | Mod: CPTII,S$GLB,,

## 2024-07-02 PROCEDURE — 99459 PELVIC EXAMINATION: CPT | Mod: S$GLB,,,

## 2024-07-02 PROCEDURE — 3079F DIAST BP 80-89 MM HG: CPT | Mod: CPTII,S$GLB,,

## 2024-07-02 RX ORDER — ESCITALOPRAM OXALATE 20 MG/1
TABLET, FILM COATED ORAL
COMMUNITY
Start: 2023-10-01

## 2024-07-02 RX ORDER — FLIBANSERIN 100 MG/1
100 TABLET, FILM COATED ORAL NIGHTLY
Qty: 30 TABLET | Refills: 12 | Status: SHIPPED | OUTPATIENT
Start: 2024-07-02

## 2024-07-02 NOTE — PROGRESS NOTES
Subjective:      Patient ID: Sachi Loaiza is a 29 y.o. female who presents for evaluation today.    Chief Complaint:    Well Woman and Hormones (Pt would like to get her hormones checked. She states that she is always tired and has no sex drive.)      History of Present Illness  HPI  Annual Exam (Premenopausal) - Patient presents for annual exam. The patient also has complaints of fatigue, decreased libido. The patient is sexually active. GYN screening history: last pap: was normal. The patient wears seatbelts: yes. The patient participates in regular exercise: no. Has the patient ever been transfused or tattooed?: not asked. The patient reports that there is not domestic violence in her life. She reports regular periods, some are light others are heavy. She also notes decreased libido, weight gain, and fatigue. She has had gastric sleeve, and does low carb diet. She is open to pregnancy at least once more. She denies GI or  problems. She sees Dr. Freitas for her primary care, will see him in August.     GYN History  No LMP recorded (within weeks).   Date of Last Pap: Up to date in accordance with ASCCP guidelines Pap smear schedule reviewed with patient Result history reviewed with patient Pap smear not performed    VITALS  /87   Pulse (!) 59   Wt 110.7 kg (244 lb)   LMP  (Within Weeks)   Breastfeeding Unknown   BMI 43.22 kg/m²   Weight: 110.7 kg (244 lb)         PAST MEDICAL HISTORY  Past Medical History:   Diagnosis Date    Abnormal bleeding in menstrual cycle     ADD (attention deficit disorder)     Anemia     Anxiety     Cervical lesion     COVID-19     Patient has documented two postive Covid     Dyspareunia     Endometriosis     Endometriosis     PCOS (polycystic ovarian syndrome)     Twin gestation in first trimester     Weight gain        PAST SURGICAL HISTORY  Past Surgical History:   Procedure Laterality Date    ABDOMINAL SURGERY  07/2018    Gastric Sleeve    AUGMENTATION OF BREAST        SECTION      twin delivery    DILATION AND CURETTAGE OF UTERUS      gastric sleeve  2018    LAPAROSCOPY      OVARIAN CYST REMOVAL      REDUCTION OF BOTH BREASTS  2016       SOCIAL HISTORY  Social History     Tobacco Use   Smoking Status Never   Smokeless Tobacco Never   ,   Social History     Substance and Sexual Activity   Alcohol Use Not Currently    Comment: Socially        MEDICATIONS  Outpatient Medications Marked as Taking for the 24 encounter (Office Visit) with Charmaine Garcia NP   Medication Sig Dispense Refill    LEXAPRO 20 mg tablet            Review of Systems   Review of Systems   Constitutional:  Positive for fatigue and unexpected weight change. Negative for activity change.   Eyes:  Negative for visual disturbance.   Respiratory:  Negative for shortness of breath.    Cardiovascular:  Negative for chest pain.   Gastrointestinal:  Negative for abdominal pain.   Genitourinary:  Positive for decreased libido. Negative for vaginal bleeding.        No abnormal vaginal bleeding   Musculoskeletal:  Negative for back pain.   Integumentary:  Negative for rash and breast mass.   Neurological:  Negative for numbness.   Psychiatric/Behavioral:          No mood disturbance or changes    Breast: Negative for mass          Objective:     Physical Exam:   Constitutional: She is oriented to person, place, and time. She appears well-developed. She is cooperative.    HENT:   Head: Normocephalic.     Neck: Trachea normal. No thyromegaly present.    Cardiovascular:  Normal rate, regular rhythm and normal heart sounds.             Pulmonary/Chest: Effort normal and breath sounds normal. Right breast exhibits no mass, no nipple discharge and no skin change. Left breast exhibits no mass, no nipple discharge and no skin change.        Abdominal: Soft. There is no abdominal tenderness. There is no rebound and no guarding.     Genitourinary:    Vagina and uterus normal.      Pelvic exam was performed with  patient supine.   The external female genitalia was normal.     Labial bartholins normal.There is no lesion on the right labia. There is no lesion on the left labia. Cervix is normal. Right adnexum displays no mass and no tenderness. Left adnexum displays no mass and no tenderness. Cervix exhibits no discharge. Uterus is not enlarged and not tender.              Lymphadenopathy:        Head (right side): No submental and no submandibular adenopathy present.        Head (left side): No submental and no submandibular adenopathy present.     She has no cervical adenopathy.    Neurological: She is alert and oriented to person, place, and time.    Skin: Skin is warm.    Psychiatric: She has a normal mood and affect. Her speech is normal and behavior is normal. Thought content normal.          Assessment:        1. Well woman exam with routine gynecological exam    2. Encounter for laboratory test    3. History of PCOS    4. Hypoactive sexual desire disorder       Well woman exam with routine gynecological exam    Encounter for laboratory test  -     Vitamin D; Future; Expected date: 07/02/2024  -     CBC Auto Differential; Future; Expected date: 07/02/2024  -     Comprehensive Metabolic Panel; Future; Expected date: 07/02/2024  -     T4, Free; Future; Expected date: 07/02/2024  -     TSH; Future; Expected date: 07/02/2024  -     Urinalysis, Reflex to Urine Culture Urine, Clean Catch; Future; Expected date: 07/02/2024  -     Lipid Panel; Future; Expected date: 07/02/2024    History of PCOS  -     Testosterone; Future; Expected date: 07/02/2024  -     Testosterone, Free; Future; Expected date: 07/02/2024  -     Insulin, Random; Future; Expected date: 07/02/2024  -     Luteinizing Hormone; Future; Expected date: 07/02/2024  -     Follicle Stimulating Hormone; Future; Expected date: 07/02/2024  -     Hemoglobin A1C; Future; Expected date: 07/02/2024  -     US OB/GYN Procedure ([facility]); Future    Hypoactive sexual desire  disorder  -     flibanserin (ADDYI) 100 mg Tab; Take 100 mg by mouth every evening.  Dispense: 30 tablet; Refill: 12         Plan:     Patient instructed to contact the clinic should any questions or concerns arise prior to her next office visit. Patient is happy with the plan of care at this time, verbalizes understanding and denies outstanding questions.      Self-breast exams  Birth Control discussed  F/U med check in 2 mos  If you don't hear from the office regarding results within 1 week, please call  Follow up in 1 year for annual or sooner as needed  Chaperone present for exam

## 2024-07-06 ENCOUNTER — PATIENT MESSAGE (OUTPATIENT)
Dept: OBSTETRICS AND GYNECOLOGY | Facility: CLINIC | Age: 29
End: 2024-07-06
Payer: MEDICAID

## 2024-07-09 ENCOUNTER — PROCEDURE VISIT (OUTPATIENT)
Dept: OBSTETRICS AND GYNECOLOGY | Facility: CLINIC | Age: 29
End: 2024-07-09
Payer: MEDICAID

## 2024-07-09 DIAGNOSIS — Z87.42 HISTORY OF PCOS: ICD-10-CM

## 2024-07-09 PROCEDURE — 76830 TRANSVAGINAL US NON-OB: CPT | Mod: S$GLB,,, | Performed by: OBSTETRICS & GYNECOLOGY

## 2024-07-11 ENCOUNTER — PATIENT MESSAGE (OUTPATIENT)
Dept: OBSTETRICS AND GYNECOLOGY | Facility: CLINIC | Age: 29
End: 2024-07-11
Payer: MEDICAID

## 2024-07-19 DIAGNOSIS — E55.9 VITAMIN D DEFICIENCY: Primary | ICD-10-CM

## 2024-07-19 DIAGNOSIS — R68.82 DECREASED LIBIDO: ICD-10-CM

## 2024-07-19 RX ORDER — TESTOSTERONE
POWDER (GRAM) MISCELLANEOUS
Qty: 30 G | Refills: 5 | Status: SHIPPED | OUTPATIENT
Start: 2024-07-19

## 2024-07-19 RX ORDER — ASPIRIN 325 MG
50000 TABLET, DELAYED RELEASE (ENTERIC COATED) ORAL
Qty: 12 CAPSULE | Refills: 0 | Status: SHIPPED | OUTPATIENT
Start: 2024-07-19 | End: 2024-10-05

## 2024-08-27 NOTE — TELEPHONE ENCOUNTER
Pt requests refill on Sertraline 100mg.  States her PCP has passed away and asks Dr. Wagner to refill it.  Pt advised he will refill while she finds a new PCP.  Pt verbalized understanding.  Medication pending, pharmacy up to date.  Please advise.    fever

## 2024-08-29 DIAGNOSIS — N83.209 CYST OF OVARY, UNSPECIFIED LATERALITY: Primary | ICD-10-CM

## 2024-09-16 ENCOUNTER — PROCEDURE VISIT (OUTPATIENT)
Dept: OBSTETRICS AND GYNECOLOGY | Facility: CLINIC | Age: 29
End: 2024-09-16
Payer: MEDICAID

## 2024-09-16 DIAGNOSIS — N83.209 CYST OF OVARY, UNSPECIFIED LATERALITY: ICD-10-CM

## 2024-11-04 ENCOUNTER — PATIENT MESSAGE (OUTPATIENT)
Dept: OBSTETRICS AND GYNECOLOGY | Facility: CLINIC | Age: 29
End: 2024-11-04
Payer: MEDICAID

## 2024-11-04 DIAGNOSIS — R39.89 URINARY PROBLEM: Primary | ICD-10-CM

## 2024-12-05 ENCOUNTER — PATIENT MESSAGE (OUTPATIENT)
Dept: OBSTETRICS AND GYNECOLOGY | Facility: CLINIC | Age: 29
End: 2024-12-05
Payer: MEDICAID

## 2024-12-05 DIAGNOSIS — F41.9 ANXIETY: Primary | ICD-10-CM

## 2024-12-05 RX ORDER — ALPRAZOLAM 0.5 MG/1
0.5 TABLET ORAL EVERY 8 HOURS PRN
Qty: 28 TABLET | Refills: 0 | Status: SHIPPED | OUTPATIENT
Start: 2024-12-05 | End: 2025-12-05

## 2025-01-04 NOTE — TELEPHONE ENCOUNTER
----- Message from Arlene Bacon sent at 12/28/2020  1:58 PM CST -----  pt needs call back to discuss fertility medication she's on..[phone]    
PHONE CALL RETURNED  PATIENT SCHEDULED FOR ULTRASOUND FOR ANOVULATION / INFERTILITY  
R knee pain s/p fall playing soccer

## 2025-08-22 ENCOUNTER — PATIENT MESSAGE (OUTPATIENT)
Dept: OBSTETRICS AND GYNECOLOGY | Facility: CLINIC | Age: 30
End: 2025-08-22
Payer: MEDICAID